# Patient Record
Sex: MALE | Race: AMERICAN INDIAN OR ALASKA NATIVE
[De-identification: names, ages, dates, MRNs, and addresses within clinical notes are randomized per-mention and may not be internally consistent; named-entity substitution may affect disease eponyms.]

---

## 2018-06-23 ENCOUNTER — HOSPITAL ENCOUNTER (EMERGENCY)
Dept: HOSPITAL 43 - DL.ED | Age: 63
Discharge: HOME | End: 2018-06-23
Payer: MEDICAID

## 2018-06-23 DIAGNOSIS — A04.72: Primary | ICD-10-CM

## 2018-06-23 DIAGNOSIS — Z79.84: ICD-10-CM

## 2018-06-23 DIAGNOSIS — E11.9: ICD-10-CM

## 2018-06-23 DIAGNOSIS — E78.00: ICD-10-CM

## 2018-06-23 DIAGNOSIS — I10: ICD-10-CM

## 2018-06-23 LAB
CHLORIDE SERPL-SCNC: 98 MMOL/L (ref 101–111)
SODIUM SERPL-SCNC: 136 MMOL/L (ref 135–145)

## 2018-06-24 NOTE — EDM.PDOC
Scribed by Anh Turner 06/23/18 0017 for Elsie Joe NP





ED HPI GENERAL MEDICAL PROBLEM





- General


Chief Complaint: Gastrointestinal Problem


Stated Complaint: CAME BY AMBULANCE. ABD PAINS


Time Seen by Provider: 06/23/18 17:14


Source of Information: Reports: Patient, RN, RN Notes Reviewed


History Limitations: Reports: No Limitations





- History of Present Illness


INITIAL COMMENTS - FREE TEXT/NARRATIVE: 


Patient presents to ER per [facility] service with complaint of 

diarrhea and stomach pain. Patient states he was admitted here last week and 

discharged on Thursday last week with dehydration, diarrhea, C-diff, and 

hypokalemia. Patient states he has not taken any medications he was discharged 

with. Rates his abdominal pain 5/10. He has fever, chills, nausea, vomiting, 

diarrhea and shortness of breath. No chest pain. 


Onset: Gradual


Duration: Getting Worse


Location: Reports: Generalized


Severity: Moderate


Improves with: Reports: None


Worsens with: Reports: None


Associated Symptoms: Reports: No Other Symptoms


  ** Abdominal


Pain Score (Numeric/FACES): 5





- Related Data


 Allergies











Allergy/AdvReac Type Severity Reaction Status Date / Time


 


No Known Allergies Allergy   Verified 06/16/18 01:07











Home Meds: 


 Home Meds





DULoxetine [Cymbalta] 60 mg PO DAILY 06/16/18 [History]


Glimepiride [Amaryl] 2 mg PO BIDMEALS 06/16/18 [History]


Hydrochlorothiazide 12.5 mg PO DAILY 06/16/18 [History]


Losartan [Cozaar] 100 mg PO DAILY 06/16/18 [History]


Meloxicam 7.5 mg PO BID 06/16/18 [History]


atorvaSTATin [Lipitor] 10 mg PO DAILY 06/16/18 [History]


metFORMIN HCl [Glucophage] 1,000 mg PO BIDMEALS 06/16/18 [History]











Past Medical History


Cardiovascular History: Reports: High Cholesterol, Hypertension


Gastrointestinal History: Reports: Other (See Below)


Other Gastrointestinal History: c-diff


Musculoskeletal History: Reports: Osteoarthritis


Other Musculoskeletal History: COMPRESSION FRACTURE t12, L1, L5


Neurological History: Reports: Other (See Below)


Other Neuro History: BILATERAL HAND PAIN, RESTLESS LEGS


Endocrine/Metabolic History: Reports: Diabetes, Type II





Social & Family History





- Family History


Family Medical History: Noncontributory





- Tobacco Use


Smoking Status *Q: Never Smoker





- Caffeine Use


Caffeine Use: Reports: Coffee, Tea





- Recreational Drug Use


Recreational Drug Use: No





ED ROS GENERAL





- Review of Systems


Review Of Systems: ROS reveals no pertinent complaints other than HPI.





ED EXAM, GI/ABD





- Physical Exam


Exam: See Below


Exam Limited By: No Limitations


General Appearance: Alert, WD/WN, No Apparent Distress


Eyes: Bilateral: Normal Appearance


Ears: Normal External Exam, Normal Canal, Hearing Grossly Normal, Normal TMs


Nose: Normal Inspection, Normal Mucosa, No Blood


Throat/Mouth: Normal Inspection, Normal Lips, Normal Teeth, Normal Gums, Normal 

Oropharynx, Normal Voice, No Airway Compromise


Head: Atraumatic, Normocephalic


Neck: Normal Inspection, Supple, Non-Tender, Full Range of Motion


Respiratory/Chest: No Respiratory Distress, Lungs Clear, Normal Breath Sounds, 

No Accessory Muscle Use, Chest Non-Tender


Cardiovascular: Normal Peripheral Pulses, Regular Rate, Rhythm, No Edema, No 

Gallop, No JVD, No Murmur, No Rub


GI/Abdominal Exam: Tender


 (Male) Exam: Deferred


Rectal (Males) Exam: Deferred


Back Exam: Normal Inspection, Full Range of Motion, NT


Extremities: Other (bilateral foot edema.)


Neurological: Alert, Oriented, CN II-XII Intact, Normal Cognition, Normal Gait, 

Normal Reflexes, No Motor/Sensory Deficits


Psychiatric: Normal Affect, Normal Mood


Skin Exam: Warm, Dry, Intact, Normal Color, No Rash


Lymphatic: No Adenopathy





Course





- Vital Signs


Last Recorded V/S: 


 Last Vital Signs











Temp  98.2 F   06/23/18 16:09


 


Pulse  107 H  06/23/18 16:09


 


Resp  16   06/23/18 16:09


 


BP  113/79   06/23/18 16:09


 


Pulse Ox  100   06/23/18 16:09














- Orders/Labs/Meds


Orders: 


 Active Orders 24 hr











 Category Date Time Status


 


 Peripheral IV Care [RC] .AS DIRECTED Care  06/23/18 17:24 Active


 


 Peripheral IV Insertion Adult [OM.PC] Stat Oth  06/23/18 17:24 Ordered











Labs: 


 Laboratory Tests











  06/23/18 06/23/18 Range/Units





  17:48 17:48 


 


WBC  10.4 H   (5.0-10.0)  10^3/uL


 


RBC  3.92 L   (4.6-6.2)  10^6/uL


 


Hgb  12.2 L   (14.0-18.0)  g/dL


 


Hct  35.5 L   (40.0-54.0)  %


 


MCV  90.6   ()  fL


 


MCH  31.1   (27.0-34.0)  pg


 


MCHC  34.4   (33.0-35.0)  g/dL


 


Plt Count  425  D   (150-450)  10^3/uL


 


Neut % (Auto)  71.0   (42.2-75.2)  %


 


Lymph % (Auto)  18.8 L   (20.5-50.1)  %


 


Mono % (Auto)  9.5 H   (2-8)  %


 


Eos % (Auto)  0.4 L   (1.0-3.0)  %


 


Baso % (Auto)  0.3   (0.0-1.0)  %


 


Sodium   136  (135-145)  mmol/L


 


Potassium   3.6  (3.6-5.0)  mmol/L


 


Chloride   98 L  (101-111)  mmol/L


 


Carbon Dioxide   30.0  (21.0-31.0)  mmol/L


 


Anion Gap   11.6  


 


BUN   6 L  (7-18)  mg/dL


 


Creatinine   0.9  (0.6-1.3)  mg/dL


 


Est Cr Clr Drug Dosing   79.56  mL/min


 


Estimated GFR (MDRD)   > 60  


 


BUN/Creatinine Ratio   6.66  


 


Glucose   156 H  ()  mg/dL


 


Calcium   8.4  (8.4-10.2)  mg/dl


 


Total Bilirubin   0.5  (0.2-1.0)  mg/dL


 


AST   17  (10-42)  IU/L


 


ALT   12  (10-60)  IU/L


 


Alkaline Phosphatase   53  ()  IU/L


 


Total Protein   6.6 L  (6.7-8.2)  g/dl


 


Albumin   2.7 L  (3.2-5.5)  g/dl


 


Globulin   3.9  


 


Albumin/Globulin Ratio   0.69  











Meds: 


Medications














Discontinued Medications














Generic Name Dose Route Start Last Admin





  Trade Name Freq  PRN Reason Stop Dose Admin


 


Sodium Chloride  1,000 mls @ 200 mls/hr  06/23/18 17:24  06/23/18 18:51





  Normal Saline  IV  06/23/18 22:23  Not Given





  .BOLUS ONE   





     





     





     





     


 


Sodium Chloride  10 ml  06/23/18 17:24  





  Saline Flush  FLUSH   





  ASDIRECTED PRN   





  Keep Vein Open   





     





     





     














Departure





- Departure


Time of Disposition: 18:52


Disposition: Home, Self-Care 01


Condition: Fair


Clinical Impression: 


 Diarrhea, C. difficile diarrhea








- Discharge Information


Instructions:  Food Choices to Help Relieve Diarrhea, Adult, Clostridium 

Difficile Infection, Easy-to-Read, Nausea and Vomiting, Adult, Easy-to-Read, 

Dehydration, Adult, Easy-to-Read, Abdominal Pain, Adult, Easy-to-Read, Diarrhea

, Adult, Easy-to-Read


Referrals: 


PCP,None [Primary Care Provider] - 


Forms:  ED Department Discharge


Additional Instructions: 


Follow up with Dr. Chavez next week


Go to [facility] and ask for blister packaging


Drink plenty of fluids


Take all medications as prescribed








- My Orders


Last 24 Hours: 


My Active Orders





06/23/18 17:24


Peripheral IV Care [RC] .AS DIRECTED 


Peripheral IV Insertion Adult [OM.PC] Stat 














- Assessment/Plan


Last 24 Hours: 


My Active Orders





06/23/18 17:24


Peripheral IV Care [RC] .AS DIRECTED 


Peripheral IV Insertion Adult [OM.PC] Stat 














I have read and agree with the documentation that has been completed regarding 

this visit. By signing this record, I attest that the documentation was 

completed in my physical presence and is an accurate record of the encounter.

## 2019-04-20 ENCOUNTER — HOSPITAL ENCOUNTER (EMERGENCY)
Dept: HOSPITAL 43 - DL.ED | Age: 64
Discharge: HOME | End: 2019-04-20
Payer: MEDICAID

## 2019-04-20 DIAGNOSIS — Z79.899: ICD-10-CM

## 2019-04-20 DIAGNOSIS — E11.9: ICD-10-CM

## 2019-04-20 DIAGNOSIS — I10: ICD-10-CM

## 2019-04-20 DIAGNOSIS — Z79.84: ICD-10-CM

## 2019-04-20 DIAGNOSIS — M79.605: Primary | ICD-10-CM

## 2019-04-20 DIAGNOSIS — E78.00: ICD-10-CM

## 2019-04-20 LAB
ANION GAP SERPL CALC-SCNC: 15.9 MMOL/L
CHLORIDE SERPL-SCNC: 100 MMOL/L (ref 101–111)
SODIUM SERPL-SCNC: 137 MMOL/L (ref 135–145)
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
## 2019-04-27 ENCOUNTER — HOSPITAL ENCOUNTER (INPATIENT)
Dept: HOSPITAL 43 - DL.ED | Age: 64
LOS: 2 days | Discharge: HOME | DRG: 603 | End: 2019-04-29
Attending: HOSPITALIST | Admitting: INTERNAL MEDICINE
Payer: MEDICAID

## 2019-04-27 DIAGNOSIS — F32.9: ICD-10-CM

## 2019-04-27 DIAGNOSIS — L03.115: ICD-10-CM
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
## 2019-04-27 DIAGNOSIS — Z79.899: ICD-10-CM

## 2019-04-27 DIAGNOSIS — E87.6: ICD-10-CM

## 2019-04-27 DIAGNOSIS — E11.9: ICD-10-CM

## 2019-04-27 DIAGNOSIS — I10: ICD-10-CM

## 2019-04-27 DIAGNOSIS — L03.116: Primary | ICD-10-CM

## 2019-04-27 DIAGNOSIS — M19.91: ICD-10-CM

## 2019-04-27 DIAGNOSIS — Z79.84: ICD-10-CM

## 2019-04-27 DIAGNOSIS — E78.00: ICD-10-CM

## 2019-04-27 DIAGNOSIS — F10.120: ICD-10-CM

## 2019-04-27 LAB
ANION GAP SERPL CALC-SCNC: 16.3 MMOL/L
CHLORIDE SERPL-SCNC: 102 MMOL/L (ref 101–111)
SODIUM SERPL-SCNC: 142 MMOL/L (ref 135–145)

## 2019-04-27 PROCEDURE — HZ2ZZZZ DETOXIFICATION SERVICES FOR SUBSTANCE ABUSE TREATMENT: ICD-10-PCS | Performed by: INTERNAL MEDICINE

## 2019-04-27 PROCEDURE — G0480 DRUG TEST DEF 1-7 CLASSES: HCPCS

## 2019-04-27 RX ADMIN — HEPARIN SODIUM SCH UNITS: 5000 INJECTION, SOLUTION INTRAVENOUS; SUBCUTANEOUS at 21:16
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
## 2019-04-28 LAB
ANION GAP SERPL CALC-SCNC: 16.1 MMOL/L
CHLORIDE SERPL-SCNC: 97 MMOL/L (ref 101–111)
SODIUM SERPL-SCNC: 139 MMOL/L (ref 135–145)

## 2019-04-28 RX ADMIN — HEPARIN SODIUM SCH: 5000 INJECTION, SOLUTION INTRAVENOUS; SUBCUTANEOUS at 05:41

## 2019-04-28 RX ADMIN — HEPARIN SODIUM SCH UNITS: 5000 INJECTION, SOLUTION INTRAVENOUS; SUBCUTANEOUS at 13:39

## 2019-04-28 RX ADMIN — HEPARIN SODIUM SCH UNITS: 5000 INJECTION, SOLUTION INTRAVENOUS; SUBCUTANEOUS at 22:00
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
## 2019-04-29 RX ADMIN — HEPARIN SODIUM SCH UNITS: 5000 INJECTION, SOLUTION INTRAVENOUS; SUBCUTANEOUS at 06:25

## 2019-04-29 NOTE — PCM.DCSUM1
**Discharge Summary





- Hospital Course


Free Text/Narrative:: 





This is a 65 yo male with past medical history of Diabetes II, Hypertension, 

Hyperlipidemia, chronic alcohol use who presents with b/l lower ext cellulitis 

and alcohol intoxication. 





He was monitored in the hospital, no withdrawal symptoms, CIWA was 0.


Was treated with IV zosyn initially for the cellulitis with remarkable 

improvement in the redness, swelling, pain of LE


Was discharged on oral Keflex to complete 10 day course


Potassium was low during admission. Was encouraged to eat potassium rich foods 

and also prescribed oral potassium 10 mEq daily for one week





Follow up with PCP


Diagnosis: Stroke: No


Modified Caldwell Scale: No Symptoms at All


Modified Caldwell Scale Score: 0





- Discharge Data


Discharge Date: 04/29/19


Discharge Disposition: Home, Self-Care 01


Condition: Stable





- Patient Instructions


Diet: Usual Diet as Tolerated, No Alcoholic Beverages


Activity: As Tolerated


Showering/Bathing: May Shower





- Discharge Plan


*PRESCRIPTION DRUG MONITORING PROGRAM REVIEWED*: No


*COPY OF PRESCRIPTION DRUG MONITORING REPORT IN PATIENT JESÚS: No


Prescriptions/Med Rec: 


cephALEXin [Keflex] 500 mg PO Q8H #21 cap


Potassium Chloride 10 meq PO DAILY #7 capsule.er


Home Medications: 


 Home Meds





DULoxetine [Cymbalta] 60 mg PO DAILY 06/16/18 [History]


Glimepiride [Amaryl] 2 mg PO BIDMEALS 06/16/18 [History]


Losartan [Cozaar] 100 mg PO DAILY 06/16/18 [History]


Meloxicam 7.5 mg PO BID 06/16/18 [History]


atorvaSTATin [Lipitor] 10 mg PO DAILY 06/16/18 [History]


hydroCHLOROthiazide [Hydrochlorothiazide] 12.5 mg PO DAILY 06/16/18 [History]


metFORMIN HCl [Glucophage] 1,000 mg PO BIDMEALS 06/16/18 [History]


Potassium Chloride 10 meq PO DAILY #7 capsule.er 04/29/19 [Rx]


cephALEXin [Keflex] 500 mg PO Q8H #21 cap 04/29/19 [Rx]








Patient Handouts:  Alcohol Intoxication, Cellulitis, Adult, Easy-to-Read


Referrals: 


PCP,None [Primary Care Provider] - 





- Discharge Summary/Plan Comment


DC Time >30 min.: Yes





- General Info


Date of Service: 04/29/19


Admission Dx/Problem (Free Text: 


Admitted with: B/L LE cellulitis and acute alcohol Intoxication





Subjective Update: 


pt was seen in room doing well, no nausea or vomiting, appetite is good and B/L 

Erythema has improved








- Review of Systems


General: Reports: No Symptoms


HEENT: Reports: No Symptoms


Pulmonary: Reports: No Symptoms


Cardiovascular: Reports: No Symptoms


Gastrointestinal: Reports: No Symptoms


Genitourinary: Reports: No Symptoms


Musculoskeletal: Reports: Other (improved redness and swelling of b/l LE)





- Patient Data


Vitals - Most Recent: 


 Last Vital Signs











Temp  36.9 C   04/29/19 07:52


 


Pulse  91   04/29/19 07:52


 


Resp  20   04/29/19 07:52


 


BP  146/84 H  04/29/19 07:59


 


Pulse Ox  98   04/29/19 07:52











Weight - Most Recent: 68.039 kg


I&O - Last 24 hours: 


 Intake & Output











 04/28/19 04/29/19 04/29/19





 22:59 06:59 14:59


 


Intake Total 1092  602


 


Output Total 950  


 


Balance 142  602











Lab Results - Last 24 hrs: 


 Laboratory Results - last 24 hr











  04/28/19 04/28/19 04/28/19 Range/Units





  11:20 17:03 20:51 


 


Potassium     (3.6-5.0)  mmol/L


 


POC Glucose  152 H  174 H  124 H  ()  mg/dl














  04/29/19 04/29/19 Range/Units





  06:05 07:47 


 


Potassium  3.2 L   (3.6-5.0)  mmol/L


 


POC Glucose   102  ()  mg/dl











Med Orders - Current: 


 Current Medications





Acetaminophen (Tylenol)  650 mg PO Q4H PRN


   PRN Reason: Pain (mild 1-3 )/fever


   Last Admin: 04/29/19 07:58 Dose:  650 mg


Atorvastatin Calcium (Lipitor)  10 mg PO DAILY [facility]


   Last Admin: 04/29/19 08:00 Dose:  10 mg


Docusate Sodium (Colace)  100 mg PO DAILY PRN


   PRN Reason: Constipation


Duloxetine HCl (Cymbalta)  60 mg PO DAILY [facility]


   Last Admin: 04/29/19 08:00 Dose:  60 mg


Glimepiride (Amaryl)  2 mg PO BIDMEALS [facility]


   Last Admin: 04/29/19 07:57 Dose:  2 mg


Heparin Sodium (Porcine) (Heparin Sodium)  5,000 units SUBCUT Q8H [facility]


   Last Admin: 04/29/19 06:25 Dose:  5,000 units


Hydrochlorothiazide (Hydrochlorothiazide)  12.5 mg PO DAILY [facility]


   Last Admin: 04/29/19 08:00 Dose:  12.5 mg


Piperacillin Sod/Tazobactam (Sod 3.375 gm/ Sodium Chloride)  100 mls @ 200 mls/

hr IV Q6H [facility]


   Last Infusion: 04/29/19 10:09 Dose:  Infused


Insulin Human Lispro (Humalog)  0 unit SUBCUT ACBED [facility]; Protocol


   Last Admin: 04/29/19 07:59 Dose:  Not Given


Lorazepam (Ativan)  0 mg PO ASDIRECTED PRN; Protocol


   PRN Reason: Anxiety


Lorazepam (Ativan)  0 mg IVPUSH ASDIRECTED PRN; Protocol


   PRN Reason: Anxiety


Losartan Potassium (Cozaar)  100 mg PO DAILY [facility]


   Last Admin: 04/29/19 07:59 Dose:  100 mg


Metformin HCl (Glucophage)  1,000 mg PO BIDMEALS [facility]


   Last Admin: 04/29/19 08:00 Dose:  1,000 mg


Sodium Chloride (Saline Flush)  10 ml FLUSH Q4HR PRN


   PRN Reason: Keep Vein Open


   Last Admin: 04/29/19 08:01 Dose:  10 ml





Discontinued Medications





Potassium Chloride (Klor-Con 10)  40 meq PO ONETIME ONE


   Stop: 04/28/19 13:16


   Last Admin: 04/28/19 13:38 Dose:  40 meq


Potassium Chloride (Klor-Con 10)  40 meq PO ONETIME ONE


   Stop: 04/29/19 09:46


   Last Admin: 04/29/19 10:16 Dose:  40 meq











- Exam


General: Reports: Alert, Oriented


HEENT: Reports: Pupils Equal


Neck: Reports: Supple


Lungs: Reports: Clear to Auscultation


Cardiovascular: Reports: Regular Rate, Regular Rhythm


GI/Abdominal Exam: Normal Bowel Sounds, Soft

## 2019-08-24 ENCOUNTER — HOSPITAL ENCOUNTER (EMERGENCY)
Dept: HOSPITAL 43 - DL.ED | Age: 64
Discharge: NURSING, REHAB OR LONG-TERM CARE | End: 2019-08-24
Payer: MEDICAID

## 2019-08-24 DIAGNOSIS — Z79.84: ICD-10-CM

## 2019-08-24 DIAGNOSIS — S32.039A: ICD-10-CM

## 2019-08-24 DIAGNOSIS — N17.9: ICD-10-CM

## 2019-08-24 DIAGNOSIS — E87.1: ICD-10-CM

## 2019-08-24 DIAGNOSIS — I10: ICD-10-CM

## 2019-08-24 DIAGNOSIS — Z79.899: ICD-10-CM

## 2019-08-24 DIAGNOSIS — S32.059A: ICD-10-CM

## 2019-08-24 DIAGNOSIS — E87.6: ICD-10-CM
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
## 2019-08-24 DIAGNOSIS — E78.00: ICD-10-CM

## 2019-08-24 DIAGNOSIS — S32.029A: Primary | ICD-10-CM

## 2019-08-24 DIAGNOSIS — E11.9: ICD-10-CM

## 2019-08-24 LAB
ANION GAP SERPL CALC-SCNC: 24 MMOL/L
CHLORIDE SERPL-SCNC: 76 MMOL/L (ref 101–111)
SODIUM SERPL-SCNC: 121 MMOL/L (ref 135–145)

## 2019-08-24 PROCEDURE — 36415 COLL VENOUS BLD VENIPUNCTURE: CPT

## 2019-08-24 PROCEDURE — 72100 X-RAY EXAM L-S SPINE 2/3 VWS: CPT

## 2019-08-24 PROCEDURE — 99285 EMERGENCY DEPT VISIT HI MDM: CPT

## 2019-08-24 PROCEDURE — 80053 COMPREHEN METABOLIC PANEL: CPT

## 2019-08-24 PROCEDURE — 96375 TX/PRO/DX INJ NEW DRUG ADDON: CPT

## 2019-08-24 PROCEDURE — 96361 HYDRATE IV INFUSION ADD-ON: CPT

## 2019-08-24 PROCEDURE — 81001 URINALYSIS AUTO W/SCOPE: CPT

## 2019-08-24 PROCEDURE — 80305 DRUG TEST PRSMV DIR OPT OBS: CPT

## 2019-08-24 PROCEDURE — 96365 THER/PROPH/DIAG IV INF INIT: CPT

## 2019-08-24 PROCEDURE — 85025 COMPLETE CBC W/AUTO DIFF WBC: CPT

## 2019-08-24 NOTE — EDM.PDOC
Scribed by Anh Turner 08/24/19 0167 for Manuel Vanegas PA





ED HPI GENERAL MEDICAL PROBLEM





- General


Chief Complaint: Back Pain or Injury


Stated Complaint: AMBULANCE


Time Seen by Provider: 08/24/19 12:00


Source of Information: Reports: Patient, EMS, EMS Notes Reviewed, RN, RN Notes 

Reviewed


History Limitations: Reports: No Limitations





- History of Present Illness


INITIAL COMMENTS - FREE TEXT/NARRATIVE: 


Patient presents to ER by [facility] with complaint of low 

back for 2 days. He fell out of bed 2 days ago and had increased low back pain. 

He took medications previously given to him  by provider, Nikhil Pelayo. He has 

had no clinic visit for this problem. He has a history of chronic back pain 

with compression fracture 2 years ago and increased pain today. He has taken no 

Tylenol or Ibuprofen. 


Onset Date: 08/22/19


Duration: Getting Worse


Location: Reports: Back


Quality: Reports: Ache


Severity: Moderate


Improves with: Reports: None


Worsens with: Reports: None


Associated Symptoms: Reports: No Other Symptoms


  ** Lower Back


Pain Score (Numeric/FACES): 8





- Related Data


 Allergies











Allergy/AdvReac Type Severity Reaction Status Date / Time


 


No Known Allergies Allergy   Verified 08/24/19 12:10











Home Meds: 


 Home Meds





DULoxetine [Cymbalta] 60 mg PO DAILY 06/16/18 [History]


Glimepiride [Amaryl] 2 mg PO BIDMEALS 06/16/18 [History]


Losartan [Cozaar] 100 mg PO DAILY 06/16/18 [History]


Meloxicam 7.5 mg PO BID 06/16/18 [History]


atorvaSTATin [Lipitor] 10 mg PO DAILY 06/16/18 [History]


hydroCHLOROthiazide [Hydrochlorothiazide] 12.5 mg PO DAILY 06/16/18 [History]


metFORMIN HCl [Glucophage] 1,000 mg PO BIDMEALS 06/16/18 [History]


Potassium Chloride 10 meq PO DAILY #7 capsule.er 04/29/19 [Rx]











Past Medical History


HEENT History: Reports: None


Cardiovascular History: Reports: High Cholesterol, Hypertension


Respiratory History: Reports: None


Gastrointestinal History: Reports: Other (See Below)


Other Gastrointestinal History: c-diff


Genitourinary History: Reports: None


Musculoskeletal History: Reports: Osteoarthritis


Other Musculoskeletal History: COMPRESSION FRACTURE t12, L1, L5, nerve pain


Neurological History: Reports: Other (See Below)


Other Neuro History: BILATERAL HAND PAIN, RESTLESS LEGS


Psychiatric History: Reports: Addiction, Depression


Endocrine/Metabolic History: Reports: Diabetes, Type II


Hematologic History: Reports: None


Immunologic History: Reports: None


Oncologic (Cancer) History: Reports: None


Dermatologic History: Reports: None





- Infectious Disease History


Infectious Disease History: Reports: C-Difficile





- Past Surgical History


Head Surgeries/Procedures: Reports: None





Social & Family History





- Family History


Family Medical History: Noncontributory





- Caffeine Use


Caffeine Use: Reports: Coffee





ED ROS GENERAL





- Review of Systems


Review Of Systems: ROS reveals no pertinent complaints other than HPI.





ED EXAM,LOWER BACK PAIN/INJURY





- Physical Exam


Exam: See Below


Exam Limited By: No Limitations


General Appearance: Alert, WD/WN, No Apparent Distress


Eye Exam: Bilateral Eye: EOMI, Normal Inspection, PERRL


Ears: Normal External Exam, Normal Canal, Hearing Grossly Normal, Normal TMs


Nose: Normal Inspection, Normal Mucosa, No Blood


Throat/Mouth: Normal Inspection, Normal Lips, Normal Teeth, Normal Gums, Normal 

Oropharynx, Normal Voice, No Airway Compromise


Head: Atraumatic, Normocephalic


Neck: Normal Inspection, Supple, Non-Tender, Full Range of Motion


Respiratory/Chest: No Respiratory Distress, Lungs Clear, Normal Breath Sounds, 

No Accessory Muscle Use, Chest Non-Tender


Cardiovascular: Normal Peripheral Pulses, Regular Rate, Rhythm, No Edema, No 

Gallop, No JVD, No Murmur, No Rub


GI/Abdominal: Normal Bowel Sounds, Soft, Non-Tender, No Organomegaly, No 

Distention, No Abnormal Bruit, No Mass


 (Male) Exam: Deferred


Rectal (Males) Exam: Deferred


Back Exam: Other (low back pain )


Extremities: Normal Inspection, Normal Range of Motion, Non-Tender, No Pedal 

Edema, Normal Capillary Refill


Neurological: Alert, Normal Mood/Affect, Normal Dorsiflexion, CN II-XII Intact, 

Normal Plantar Flexion, Normal Gait, Normal Reflexes, No Motor/Sensory Deficits

, Oriented x 3


Psychiatric: Normal Affect, Normal Mood


Skin Exam: Warm, Dry, Intact, Normal Color, No Rash


Lymphatic: No Adenopathy





Course





- Vital Signs


Last Recorded V/S: 


 Last Vital Signs











Temp  36.6 C   08/24/19 12:11


 


Pulse  98   08/24/19 16:16


 


Resp  20   08/24/19 16:16


 


BP  131/68   08/24/19 16:16


 


Pulse Ox  98   08/24/19 16:16














- Orders/Labs/Meds


Orders: 





 Active Orders 24 hr











 Category Date Time Status


 


 Potassium Chloride [KCl 10 MEQ in Water 100 ML] 10 meq Med  08/24/19 17:24 

Ordered





 Premix Bag 1 bag   





 IV ONETIME   








 Medication Orders





Potassium Chloride 10 meq/ (Premix)  100 mls @ 100 mls/hr IV ONETIME ONE


   Stop: 08/24/19 18:23


   Last Admin: 08/24/19 17:33  Dose: 100 mls/hr








Labs: 





 Laboratory Tests











  08/24/19 08/24/19 08/24/19 Range/Units





  13:20 13:20 15:58 


 


WBC  10.5 H    (5.0-10.0)  10^3/uL


 


RBC  3.60 L    (4.6-6.2)  10^6/uL


 


Hgb  11.7 L    (14.0-18.0)  g/dL


 


Hct  32.3 L    (40.0-54.0)  %


 


MCV  89.7  D    ()  fL


 


MCH  32.5    (27.0-34.0)  pg


 


MCHC  36.2 H    (33.0-35.0)  g/dL


 


Plt Count  263    (150-450)  10^3/uL


 


Neut % (Auto)  79.9 H    (42.2-75.2)  %


 


Lymph % (Auto)  8.7 L    (20.5-50.1)  %


 


Mono % (Auto)  11.2 H    (2-8)  %


 


Eos % (Auto)  0.0 L    (1.0-3.0)  %


 


Baso % (Auto)  0.2    (0.0-1.0)  %


 


Sodium   121 L D   (135-145)  mmol/L


 


Potassium   3.0 L   (3.6-5.0)  mmol/L


 


Chloride   76 L D   (101-111)  mmol/L


 


Carbon Dioxide   24.0   (21.0-31.0)  mmol/L


 


Anion Gap   24.0   


 


BUN   17   (7-18)  mg/dL


 


Creatinine   1.9 H D   (0.6-1.3)  mg/dL


 


Est Cr Clr Drug Dosing   37.21   mL/min


 


Estimated GFR (MDRD)   36   


 


BUN/Creatinine Ratio   8.94   


 


Glucose   176 H   ()  mg/dL


 


Calcium   9.5   (8.4-10.2)  mg/dl


 


Total Bilirubin   2.6 H   (0.2-1.0)  mg/dL


 


AST   55 H   (10-42)  IU/L


 


ALT   32   (10-60)  IU/L


 


Alkaline Phosphatase   91   ()  IU/L


 


Total Protein   8.1   (6.7-8.2)  g/dl


 


Albumin   4.2   (3.2-5.5)  g/dl


 


Globulin   3.9   


 


Albumin/Globulin Ratio   1.08   


 


Urine Color    Yellow  (YELLOW)  


 


Urine Appearance    Clear  (CLEAR)  


 


Urine pH    6.5  (5.0-9.0)  


 


Ur Specific Gravity    1.010  (1.005-1.030)  


 


Urine Protein    30 H  (NEGATIVE)  


 


Urine Glucose (UA)    250 H  (NEGATIVE)  


 


Urine Ketones    Negative  (NEGATIVE)  


 


Urine Occult Blood    Negative  (NEGATIVE)  


 


Urine Nitrite    Negative  (NEGATIVE)  


 


Urine Bilirubin    Negative  (NEGATIVE)  


 


Urine Urobilinogen    1.0  (0.2-1.0)  mg/dL


 


Ur Leukocyte Esterase    Negative  (NEGATIVE)  


 


Urine RBC    0-5  /HPF


 


Urine WBC    0-5  (0-5/HPF)  /HPF


 


Ur Epithelial Cells    Rare  (NOT SEEN)  /HPF


 


Urine Bacteria    Few  (0-FEW/HPF)  /HPF


 


Urine Mucus    Few H  (NOT SEEN)  /LPF


 


Urine Opiates Screen     (NEGATIVE)  


 


Ur Oxycodone Screen     (NEGATIVE)  


 


Urine Methadone Screen     (NEGATIVE)  


 


Ur Barbiturates Screen     (NEGATIVE)  


 


U Tricyclic Antidepress     (NEGATIVE)  


 


Ur Phencyclidine Scrn     (NEGATIVE)  


 


Ur Amphetamine Screen     (NEGATIVE)  


 


U Methamphetamines Scrn     (NEGATIVE)  


 


Urine MDMA Screen     (NEGATIVE)  


 


U Benzodiazepines Scrn     (NEGATIVE)  


 


Urine Cocaine Screen     (NEGATIVE)  


 


U Marijuana (THC) Screen     (NEGATIVE)  














  08/24/19 Range/Units





  15:58 


 


WBC   (5.0-10.0)  10^3/uL


 


RBC   (4.6-6.2)  10^6/uL


 


Hgb   (14.0-18.0)  g/dL


 


Hct   (40.0-54.0)  %


 


MCV   ()  fL


 


MCH   (27.0-34.0)  pg


 


MCHC   (33.0-35.0)  g/dL


 


Plt Count   (150-450)  10^3/uL


 


Neut % (Auto)   (42.2-75.2)  %


 


Lymph % (Auto)   (20.5-50.1)  %


 


Mono % (Auto)   (2-8)  %


 


Eos % (Auto)   (1.0-3.0)  %


 


Baso % (Auto)   (0.0-1.0)  %


 


Sodium   (135-145)  mmol/L


 


Potassium   (3.6-5.0)  mmol/L


 


Chloride   (101-111)  mmol/L


 


Carbon Dioxide   (21.0-31.0)  mmol/L


 


Anion Gap   


 


BUN   (7-18)  mg/dL


 


Creatinine   (0.6-1.3)  mg/dL


 


Est Cr Clr Drug Dosing   mL/min


 


Estimated GFR (MDRD)   


 


BUN/Creatinine Ratio   


 


Glucose   ()  mg/dL


 


Calcium   (8.4-10.2)  mg/dl


 


Total Bilirubin   (0.2-1.0)  mg/dL


 


AST   (10-42)  IU/L


 


ALT   (10-60)  IU/L


 


Alkaline Phosphatase   ()  IU/L


 


Total Protein   (6.7-8.2)  g/dl


 


Albumin   (3.2-5.5)  g/dl


 


Globulin   


 


Albumin/Globulin Ratio   


 


Urine Color   (YELLOW)  


 


Urine Appearance   (CLEAR)  


 


Urine pH   (5.0-9.0)  


 


Ur Specific Gravity   (1.005-1.030)  


 


Urine Protein   (NEGATIVE)  


 


Urine Glucose (UA)   (NEGATIVE)  


 


Urine Ketones   (NEGATIVE)  


 


Urine Occult Blood   (NEGATIVE)  


 


Urine Nitrite   (NEGATIVE)  


 


Urine Bilirubin   (NEGATIVE)  


 


Urine Urobilinogen   (0.2-1.0)  mg/dL


 


Ur Leukocyte Esterase   (NEGATIVE)  


 


Urine RBC   /HPF


 


Urine WBC   (0-5/HPF)  /HPF


 


Ur Epithelial Cells   (NOT SEEN)  /HPF


 


Urine Bacteria   (0-FEW/HPF)  /HPF


 


Urine Mucus   (NOT SEEN)  /LPF


 


Urine Opiates Screen  Negative  (NEGATIVE)  


 


Ur Oxycodone Screen  Negative  (NEGATIVE)  


 


Urine Methadone Screen  Negative  (NEGATIVE)  


 


Ur Barbiturates Screen  Negative  (NEGATIVE)  


 


U Tricyclic Antidepress  Positive H  (NEGATIVE)  


 


Ur Phencyclidine Scrn  Negative  (NEGATIVE)  


 


Ur Amphetamine Screen  Negative  (NEGATIVE)  


 


U Methamphetamines Scrn  Negative  (NEGATIVE)  


 


Urine MDMA Screen  Negative  (NEGATIVE)  


 


U Benzodiazepines Scrn  Negative  (NEGATIVE)  


 


Urine Cocaine Screen  Negative  (NEGATIVE)  


 


U Marijuana (THC) Screen  Positive H  (NEGATIVE)  











Meds: 





Medications











Generic Name Dose Route Start Last Admin





  Trade Name Freq  PRN Reason Stop Dose Admin


 


Potassium Chloride 10 meq/  100 mls @ 100 mls/hr  08/24/19 17:24  08/24/19 17:33





  Premix  IV  08/24/19 18:23  100 mls/hr





  ONETIME ONE   Administration





     





     





     





     














Discontinued Medications














Generic Name Dose Route Start Last Admin





  Trade Name Vj  PRN Reason Stop Dose Admin


 


Hydromorphone HCl  1 mg  08/24/19 17:37  





  Dilaudid  IVPUSH  08/24/19 17:38  





  ONETIME ONE   





     





     





     





     


 


Sodium Chloride  1,000 mls @ 999 mls/hr  08/24/19 14:50  08/24/19 16:17





  Normal Saline  IV  08/24/19 15:50  999 mls/hr





  .BOLUS ONE   Administration





     





     





     





     














Departure





- Departure


Time of Disposition: 17:39


Disposition: DC/Tfer to Acute Hospital [facility]


Condition: Fair


Clinical Impression: 


 Hypokalemia, Hyponatremia





Compression fx, lumbar spine


Qualifiers:


 Encounter type: initial encounter Lumbar vertebra fracture level: unspecified 

lumbar vertebra Qualified Code(s): S32.000A - Wedge compression fracture of 

unspecified lumbar vertebra, initial encounter for closed fracture





Acute renal failure


Qualifiers:


 Acute renal failure type: unspecified Qualified Code(s): N17.9 - Acute kidney 

failure, unspecified








- Discharge Information


*PRESCRIPTION DRUG MONITORING PROGRAM REVIEWED*: Not Applicable


*COPY OF PRESCRIPTION DRUG MONITORING REPORT IN PATIENT JESÚS: Not Applicable


Forms:  Interfacility Transfer EMTALA


Care Plan Goals: 


Discussed the patient's history, examination, x-ray and lab results with Dr. Guevara. Dr. Guevara accepted the patient for continued evaluation and 

management. The patient will be transported by [facility]. 





- My Orders


Last 24 Hours: 





My Active Orders





08/24/19 17:24


Potassium Chloride [KCl 10 MEQ in Water 100 ML] 10 meq   Premix Bag 1 bag IV 

ONETIME 














- Assessment/Plan


Last 24 Hours: 





My Active Orders





08/24/19 17:24


Potassium Chloride [KCl 10 MEQ in Water 100 ML] 10 meq   Premix Bag 1 bag IV 

ONETIME 














I have read and agree with the documentation that has been completed regarding 

this visit. By signing this record, I attest that the documentation was 

completed in my physical presence and is an accurate record of the encounter.

## 2019-08-29 ENCOUNTER — HOSPITAL ENCOUNTER (INPATIENT)
Dept: HOSPITAL 43 - DL.MS | Age: 64
LOS: 14 days | Discharge: HOME | DRG: 552 | End: 2019-09-12
Attending: HOSPITALIST | Admitting: HOSPITALIST
Payer: MEDICAID

## 2019-08-29 DIAGNOSIS — Z79.84: ICD-10-CM

## 2019-08-29 DIAGNOSIS — Z99.3: ICD-10-CM

## 2019-08-29 DIAGNOSIS — E78.00: ICD-10-CM

## 2019-08-29 DIAGNOSIS — Z79.82: ICD-10-CM

## 2019-08-29 DIAGNOSIS — B02.9: ICD-10-CM

## 2019-08-29 DIAGNOSIS — S32.000A: ICD-10-CM

## 2019-08-29 DIAGNOSIS — M54.5: Primary | ICD-10-CM

## 2019-08-29 DIAGNOSIS — G89.29: ICD-10-CM

## 2019-08-29 DIAGNOSIS — E87.6: ICD-10-CM

## 2019-08-29 DIAGNOSIS — M19.90: ICD-10-CM

## 2019-08-29 DIAGNOSIS — E11.9: ICD-10-CM

## 2019-08-29 DIAGNOSIS — Z79.899: ICD-10-CM
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
## 2019-08-29 DIAGNOSIS — G25.81: ICD-10-CM

## 2019-08-29 DIAGNOSIS — F17.200: ICD-10-CM

## 2019-08-29 DIAGNOSIS — Z72.89: ICD-10-CM

## 2019-08-29 DIAGNOSIS — E87.1: ICD-10-CM

## 2019-08-29 DIAGNOSIS — Z71.6: ICD-10-CM

## 2019-08-29 DIAGNOSIS — I10: ICD-10-CM

## 2019-08-29 RX ADMIN — OXYCODONE HYDROCHLORIDE AND ACETAMINOPHEN PRN TAB: 5; 325 TABLET ORAL at 15:05

## 2019-08-29 NOTE — PCM.HP
H&P History of Present Illness





- General


Date of Service: 08/29/19


Admit Problem/Dx: 


 Admission Diagnosis/Problem





Admission Diagnosis/Problem      Back pain








Source of Information: Patient


History Limitations: Reports: No Limitations





- History of Present Illness


Initial Comments - Free Text/Narative: 





The patient is a 64-year-old man with medical history of diabetes mellitus type 

2, chronic low back pain, previous compression fracture,. The patient does a 

history of alcohol and tobacco use disorder. The patient fell 2 days prior to 

recent hospitalization at the [facility]. Thereafter began to experience 

severe low back pain. Because of that was admitted to the hospital. He had MRI 

of the lumbosacral spine which showed acute on chronic compression fracture at 

L2 with significant edema. The patient was managed conservatively. Continues to 

have pain hence was transferred to swing bed for physical and occupational 

therapy. At this time he rates intensity of his pain is 5 on a scale 0-10. It 

is worse with activity and better with rest and.


  ** Right Groin


Pain Score (Numeric/FACES): 5





- Related Data


Allergies/Adverse Reactions: 


 Allergies











Allergy/AdvReac Type Severity Reaction Status Date / Time


 


No Known Allergies Allergy   Verified 08/29/19 10:28











Home Medications: 


 Home Meds





DULoxetine [Cymbalta] 60 mg PO DAILY 06/16/18 [History]


Glimepiride [Amaryl] 2 mg PO BIDMEALS 06/16/18 [History]


Losartan [Cozaar] 100 mg PO DAILY 06/16/18 [History]


atorvaSTATin [Lipitor] 10 mg PO DAILY 06/16/18 [History]


metFORMIN HCl [Glucophage] 1,000 mg PO BIDMEALS 06/16/18 [History]


Aspirin [Halfprin] 81 mg PO DAILY 08/29/19 [History]


Gabapentin [Neurontin] 300 mg PO BID 08/29/19 [History]


Magnesium Oxide [Magnesium] 400 mg PO BID 08/29/19 [History]


Sodium Chloride 1 gm PO TID 08/29/19 [History]











Past Medical History


HEENT History: Reports: None


Cardiovascular History: Reports: High Cholesterol, Hypertension


Respiratory History: Reports: None


Gastrointestinal History: Reports: Other (See Below)


Other Gastrointestinal History: c-diff


Genitourinary History: Reports: None


Musculoskeletal History: Reports: Osteoarthritis


Other Musculoskeletal History: COMPRESSION FRACTURE t12, L1, L5, nerve pain


Neurological History: Reports: Other (See Below)


Other Neuro History: BILATERAL HAND PAIN, RESTLESS LEGS


Psychiatric History: Reports: Addiction, Depression


Endocrine/Metabolic History: Reports: Diabetes, Type II


Hematologic History: Reports: None


Immunologic History: Reports: None


Oncologic (Cancer) History: Reports: None


Dermatologic History: Reports: None





- Infectious Disease History


Infectious Disease History: Reports: C-Difficile





- Past Surgical History


Head Surgeries/Procedures: Reports: None





Social & Family History





- Family History


Family Medical History: Noncontributory





- Tobacco Use


Smoking Status *Q: Former Smoker


Years of Tobacco use: 20


Packs/Tins Daily: 2


Used Tobacco, but Quit: Yes


Month/Year Tobacco Last Used: january





- Caffeine Use


Caffeine Use: Reports: Coffee, Soda, Tea





- Alcohol Use


Days Per Week of Alcohol Use: 2


Number of Drinks Per Day: 18


Total Drinks Per Week: 36





- Recreational Drug Use


Recreational Drug Use: No





H&P Review of Systems





- Review of Systems:


Review Of Systems: See Below


General: Reports: No Symptoms


HEENT: Reports: No Symptoms


Pulmonary: Reports: No Symptoms


Cardiovascular: Reports: No Symptoms


Gastrointestinal: Reports: No Symptoms





Exam





- Exam


Exam: See Below





- Vital Signs


Vital Signs: 


 Last Vital Signs











Temp  36.4 C   08/29/19 11:08


 


Pulse  94   08/29/19 11:08


 


Resp  18   08/29/19 11:08


 


BP  161/80 H  08/29/19 11:08


 


Pulse Ox  98   08/29/19 11:08











Weight: 71.577 kg





- Exam


General: Alert, Oriented, Cooperative


Neck: Supple


Lungs: Clear to Auscultation


Cardiovascular: Regular Rate, Tachycardia


GI/Abdominal Exam: Normal Bowel Sounds, Soft, Non-Tender, No Organomegaly, No 

Distention, No Abnormal Bruit, No Mass, Pelvis Stable


 (Male) Exam: No Hernia, Normal Inspection, Normal Prostate, Circumcised


Back Exam: Decreased Range of Motion


Extremities: Normal Inspection, Normal Range of Motion, Non-Tender, No Pedal 

Edema, Normal Capillary Refill


Problem List Initiated/Reviewed/Updated: Yes


Orders Last 24hrs: 


 Active Orders 24 hr











 Category Date Time Status


 


 Patient Status [ADT] Routine ADT  08/29/19 11:08 Ordered


 


 Oxygen Therapy [RC] PRN Care  08/29/19 11:08 Ordered


 


 Up ad Kelly [RC] ASDIRECTED Care  08/29/19 11:07 Ordered


 


 Vital Signs [RC] Q4H Care  08/29/19 11:08 Ordered


 


 OT Evaluation and Treatment [CONS] Routine Cons  08/29/19 11:07 Ordered


 


 PT Evaluation and Treatment [CONS] Routine Cons  08/29/19 11:07 Ordered


 


 Consistent Carbohydrate Diet [DIET] Diet  08/29/19 Lunch Active


 


 Fluid Restriction [DIET] Diet  08/29/19 Lunch Ordered


 


 BASIC METABOLIC PANEL,BMP [CHEM] Routine Lab  08/30/19 11:18 Ordered


 


 Acetaminophen [Tylenol] Med  08/29/19 11:07 Ordered





 650 mg PO Q4H PRN   


 


 Acetaminophen/oxyCODONE [Percocet 325-5 MG] Med  08/29/19 11:07 Ordered





 1 tab PO Q4H PRN   


 


 Aspirin [Halfprin] Med  08/30/19 09:00 Ordered





 81 mg PO DAILY   


 


 DULoxetine [Cymbalta] Med  08/29/19 11:30 Ordered





 60 mg PO DAILY   


 


 Docusate Sodium [Colace] Med  08/29/19 11:07 Ordered





 100 mg PO BID PRN   


 


 Enoxaparin [Lovenox] Med  08/30/19 09:00 Ordered





 40 mg SUBCUT DAILY   


 


 Gabapentin [Neurontin] Med  08/29/19 11:30 Ordered





 300 mg PO BID   


 


 Glimepiride [Amaryl] Med  08/29/19 18:00 Ordered





 2 mg PO BIDMEALS   


 


 Losartan [Cozaar] Med  08/30/19 09:00 Ordered





 100 mg PO DAILY   


 


 Magnesium Oxide [Magnesium] Med  08/29/19 11:30 Ordered





 400 mg PO BID   


 


 Ondansetron [Zofran ODT] Med  08/29/19 11:07 Ordered





 4 mg PO Q6H PRN   


 


 Sodium Chloride Med  08/29/19 14:00 Ordered





 1 gm PO TID   


 


 Zolpidem [Ambien] Med  08/29/19 11:07 Ordered





 5 mg PO BEDTIME PRN   


 


 atorvaSTATin [Lipitor] Med  08/30/19 09:00 Ordered





 10 mg PO DAILY   


 


 metFORMIN HCl [Glucophage] Med  08/29/19 18:00 Ordered





 1,000 mg PO BIDMEALS   


 


 Glucose Management Sub Q Reflex [OM.PC] Click To Edit Oth  08/29/19 11:07 

Ordered


 


 Resuscitation Status Routine Resus Stat  08/29/19 11:07 Ordered








 Medication Orders





Acetaminophen (Tylenol)  650 mg PO Q4H PRN


   PRN Reason: Pain (Mild 1-3)/fever


Aspirin (Halfprin)  81 mg PO DAILY [facility]


Atorvastatin Calcium (Lipitor)  10 mg PO DAILY [facility]


Docusate Sodium (Colace)  100 mg PO BID PRN


   PRN Reason: Constipation


Enoxaparin Sodium (Lovenox)  40 mg SUBCUT DAILY [facility]


Gabapentin (Neurontin)  300 mg PO BID [facility]


Glimepiride (Amaryl)  2 mg PO BIDMEALS [facility]


Non-Formulary Medication (Duloxetine [Cymbalta])  60 mg PO DAILY [facility]


Non-Formulary Medication (Losartan [Cozaar])  100 mg PO DAILY [facility]


Non-Formulary Medication (Magnesium Oxide [Magnesium])  400 mg PO BID [facility]


Non-Formulary Medication (Metformin Hcl [Glucophage])  1,000 mg PO BIDMEALS [facility]


Ondansetron HCl (Zofran Odt)  4 mg PO Q6H PRN


   PRN Reason: nausea, able to take PO


Oxycodone/Acetaminophen (Percocet 325-5 Mg)  1 tab PO Q4H PRN


   PRN Reason: Pain (moderate 4-6)


Sodium Chloride (Sodium Chloride)  1 gm PO TID [facility]


Zolpidem Tartrate (Ambien)  5 mg PO BEDTIME PRN


   PRN Reason: Sleep








Assessment/Plan Comment:: 





#. Fall with compression fracture


The patient fell and sustained compression fracture of L2 


 confirmed with MRI





#. Diabetes mellitus type 2


Patient has been on glimepiride and metformin


Restart both


Monitor blood sugar before meals and at bedtime





#. Hypertension


Resume antihypertensive





#. Hyponatremia


Serum sodium is down to 130


Start patient on fluid restriction 1200/day


Salt tablet





#. Hypokalemia


Serum potassium was 3.1 .


I will obtain repeat basic metabolic panel in the morning





#. Tobacco, alcohol use disorder


Counseling provided





Consult physical therapy


Consult occupational therapy


Start patient on Percocet


Tylenol for pain control


Encourage increased ambulation

## 2019-08-30 LAB
ANION GAP SERPL CALC-SCNC: 12.8 MMOL/L
CHLORIDE SERPL-SCNC: 92 MMOL/L (ref 101–111)
SODIUM SERPL-SCNC: 129 MMOL/L (ref 135–145)

## 2019-08-30 RX ADMIN — OXYCODONE HYDROCHLORIDE AND ACETAMINOPHEN PRN TAB: 5; 325 TABLET ORAL at 10:54

## 2019-08-30 RX ADMIN — OXYCODONE HYDROCHLORIDE AND ACETAMINOPHEN PRN TAB: 5; 325 TABLET ORAL at 20:11

## 2019-08-30 RX ADMIN — OXYCODONE HYDROCHLORIDE AND ACETAMINOPHEN PRN TAB: 5; 325 TABLET ORAL at 01:46

## 2019-08-31 RX ADMIN — OXYCODONE HYDROCHLORIDE AND ACETAMINOPHEN PRN TAB: 5; 325 TABLET ORAL at 21:42

## 2019-09-01 RX ADMIN — OXYCODONE HYDROCHLORIDE AND ACETAMINOPHEN PRN TAB: 5; 325 TABLET ORAL at 22:29

## 2019-09-01 RX ADMIN — AMOXICILLIN AND CLAVULANATE POTASSIUM SCH TAB: 875; 125 TABLET, FILM COATED ORAL at 20:25

## 2019-09-01 NOTE — PCM.PN
- General Info


Date of Service: 09/01/19


Subjective Update: 





The patient developed redness over the supraorbital area extending to the nasal 

bridge. Symptoms started yesterday. They're not itchy. Has some skin 

auscultation in that area. No definite blisters noted. Has not had fever or 

chills.





- Review of Systems


General: Reports: No Symptoms


HEENT: Reports: No Symptoms


Pulmonary: Reports: No Symptoms


Cardiovascular: Reports: No Symptoms


Gastrointestinal: Reports: No Symptoms





- Patient Data


Vitals - Most Recent: 


 Last Vital Signs











Temp  36.8 C   08/31/19 20:00


 


Pulse  96   08/31/19 20:00


 


Resp  20   08/31/19 20:00


 


BP  165/82 H  09/01/19 08:03


 


Pulse Ox  98   08/31/19 20:00











Weight - Most Recent: 71.577 kg


I&O - Last 24 Hours: 


 Intake & Output











 08/31/19 09/01/19 09/01/19





 22:59 06:59 14:59


 


Intake Total 430  


 


Output Total  150 


 


Balance 430 -150 











Lab Results Last 24 Hours: 


 Laboratory Results - last 24 hr











  08/31/19 08/31/19 09/01/19 Range/Units





  11:44 16:40 07:56 


 


POC Glucose  214 H  159 H  124 H  ()  mg/dl











Med Orders - Current: 


 Current Medications





Acetaminophen (Tylenol)  650 mg PO Q4H PRN


   PRN Reason: Pain (Mild 1-3)/fever


   Last Admin: 09/01/19 08:03 Dose:  650 mg


Acyclovir (Zovirax)  800 mg PO TID [facility]


Aspirin (Halfprin)  81 mg PO DAILY [facility]


   Last Admin: 09/01/19 08:03 Dose:  81 mg


Atorvastatin Calcium (Lipitor)  10 mg PO DAILY [facility]


   Last Admin: 09/01/19 08:04 Dose:  10 mg


Docusate Sodium (Colace)  100 mg PO BID PRN


   PRN Reason: Constipation


Duloxetine HCl (Cymbalta)  60 mg PO DAILY [facility]


   Last Admin: 09/01/19 08:04 Dose:  60 mg


Enoxaparin Sodium (Lovenox)  40 mg SUBCUT DAILY [facility]


   Last Admin: 09/01/19 08:04 Dose:  40 mg


Gabapentin (Neurontin)  300 mg PO BID [facility]


   Last Admin: 09/01/19 08:03 Dose:  300 mg


Glimepiride (Amaryl)  2 mg PO BIDMEALS [facility]


   Last Admin: 09/01/19 08:04 Dose:  2 mg


Insulin Human Lispro (Humalog)  0 unit SUBCUT TIDMEALS [facility]; Protocol


   Last Admin: 08/31/19 17:10 Dose:  1 unit


Lidocaine (Lidoderm 5%)  700 mg TOP DAILY [facility]


   Last Admin: 09/01/19 08:07 Dose:  700 mg


Losartan Potassium (Cozaar)  100 mg PO DAILY [facility]


   Last Admin: 09/01/19 08:03 Dose:  100 mg


Magnesium Oxide (Magnesium Oxide)  500 mg PO BID@0800,1800 [facility]


   Last Admin: 09/01/19 08:04 Dose:  500 mg


Metformin HCl (Glucophage)  1,000 mg PO BIDMEALS [facility]


   Last Admin: 09/01/19 08:02 Dose:  1,000 mg


Miscellaneous Information (Remove Patch)  1 ea TRDERM BEDTIME [facility]


   Last Admin: 08/31/19 20:21 Dose:  Not Given


Ondansetron HCl (Zofran Odt)  4 mg PO Q6H PRN


   PRN Reason: nausea, able to take PO


Oxycodone/Acetaminophen (Percocet 325-5 Mg)  1 tab PO Q4H PRN


   PRN Reason: Pain (moderate 4-6)


   Last Admin: 08/31/19 21:42 Dose:  1 tab


Sodium Chloride (Sodium Chloride)  1 gm PO TIDMEALS [facility]


   Last Admin: 09/01/19 08:03 Dose:  1 gm


Zolpidem Tartrate (Ambien)  5 mg PO BEDTIME PRN


   PRN Reason: Sleep





Discontinued Medications





Magnesium Oxide (Magnesium Oxide)  500 mg PO BID@0700,1800 [facility]


   Last Admin: 08/29/19 17:47 Dose:  500 mg











- Exam


General: Alert, Oriented, Cooperative


HEENT: Other (Erythema around the periorbital area on the left extending into 

the scalp)


Lungs: Clear to Auscultation, Normal Respiratory Effort


Cardiovascular: Regular Rate, Regular Rhythm


GI/Abdominal Exam: Normal Bowel Sounds, Soft, Non-Tender, No Organomegaly, No 

Distention, No Abnormal Bruit, No Mass, Pelvis Stable





- Problem List Review


Problem List Initiated/Reviewed/Updated: Yes





- My Orders


Last 24 Hours: 


My Active Orders





09/01/19 14:00


Acyclovir [Zovirax]   800 mg PO TID 





09/01/19 21:00


Amoxicillin/Clavulanate K [Augmentin 875 MG/125 MG]   1 tab PO Q12HR 














- Plan


Plan:: 








#. Possible facial cellulitis


Patient has erythema at the left periorbital area extending into the scalp


Does appear to be shingles. However cannot really tell completely


Start patient on acyclovir 800 mg 3 times a day


Also start patient on Augmentin


 Place patient on contact isolation





#. Fall with compression fracture


The patient fell and sustained compression fracture of L2 


 confirmed with MRI


Physical and occupational therapy





#. Diabetes mellitus type 2


Patient has been on glimepiride and metformin


Monitor blood sugars 





#. Hypertension


Resume antihypertensive





#. Hyponatremia


Serum sodium was down to 130


Start patient on fluid restriction 1200/day


Salt tablet





#. Hypokalemia


Serum potassium was 3.1 .basic metabolic panel





#. Tobacco, alcohol use disorder


Counseling provided

## 2019-09-02 LAB
ANION GAP SERPL CALC-SCNC: 12 MMOL/L
CHLORIDE SERPL-SCNC: 90 MMOL/L (ref 101–111)
SODIUM SERPL-SCNC: 127 MMOL/L (ref 135–145)

## 2019-09-02 RX ADMIN — AMOXICILLIN AND CLAVULANATE POTASSIUM SCH TAB: 875; 125 TABLET, FILM COATED ORAL at 08:13

## 2019-09-02 RX ADMIN — OXYCODONE HYDROCHLORIDE AND ACETAMINOPHEN PRN TAB: 5; 325 TABLET ORAL at 22:09

## 2019-09-02 RX ADMIN — AMOXICILLIN AND CLAVULANATE POTASSIUM SCH TAB: 875; 125 TABLET, FILM COATED ORAL at 21:28

## 2019-09-03 LAB
ANION GAP SERPL CALC-SCNC: 14.2 MMOL/L
CHLORIDE SERPL-SCNC: 88 MMOL/L (ref 101–111)
SODIUM SERPL-SCNC: 127 MMOL/L (ref 135–145)

## 2019-09-03 RX ADMIN — AMOXICILLIN AND CLAVULANATE POTASSIUM SCH TAB: 875; 125 TABLET, FILM COATED ORAL at 21:22

## 2019-09-03 RX ADMIN — OXYCODONE HYDROCHLORIDE AND ACETAMINOPHEN PRN TAB: 5; 325 TABLET ORAL at 11:23

## 2019-09-03 RX ADMIN — AMOXICILLIN AND CLAVULANATE POTASSIUM SCH TAB: 875; 125 TABLET, FILM COATED ORAL at 08:48

## 2019-09-03 RX ADMIN — OXYCODONE HYDROCHLORIDE AND ACETAMINOPHEN PRN TAB: 5; 325 TABLET ORAL at 21:22

## 2019-09-03 NOTE — CR
EXAMINATION: Pelvis 1V or 2V  SEX: Male   AGE: 64 years

 

CLINICAL HISTORY: A 64-year-old male with pelvic pain reported on recent lumbar

spine exam to have "indeterminate" compression fractures L2, L3 and L5 vertebral

bodies.

 

 

INTERPRETATION:

 

1.  Arteriovascular calcifications soft tissues both groins and thighs.

2.  Generalized osteopenia. No pathologic skeletal lesions.

3.  Symmetric spacing normal-appearing SI and hip joints without arthritic

degenerative change (abnormal lower lumbar spine).

4.  No sign of pelvic or either hip fracture/dislocation.

## 2019-09-04 RX ADMIN — AMOXICILLIN AND CLAVULANATE POTASSIUM SCH TAB: 875; 125 TABLET, FILM COATED ORAL at 21:44

## 2019-09-04 RX ADMIN — OXYCODONE HYDROCHLORIDE AND ACETAMINOPHEN PRN TAB: 5; 325 TABLET ORAL at 08:18

## 2019-09-04 RX ADMIN — OXYCODONE HYDROCHLORIDE AND ACETAMINOPHEN PRN TAB: 5; 325 TABLET ORAL at 14:17

## 2019-09-04 RX ADMIN — AMOXICILLIN AND CLAVULANATE POTASSIUM SCH TAB: 875; 125 TABLET, FILM COATED ORAL at 09:14

## 2019-09-04 RX ADMIN — OXYCODONE HYDROCHLORIDE AND ACETAMINOPHEN PRN TAB: 5; 325 TABLET ORAL at 21:47

## 2019-09-05 LAB
ANION GAP SERPL CALC-SCNC: 12.6 MMOL/L
CHLORIDE SERPL-SCNC: 91 MMOL/L (ref 101–111)
SODIUM SERPL-SCNC: 129 MMOL/L (ref 135–145)

## 2019-09-05 RX ADMIN — AMOXICILLIN AND CLAVULANATE POTASSIUM SCH TAB: 875; 125 TABLET, FILM COATED ORAL at 09:16

## 2019-09-05 RX ADMIN — OXYCODONE HYDROCHLORIDE AND ACETAMINOPHEN PRN TAB: 5; 325 TABLET ORAL at 09:08

## 2019-09-05 RX ADMIN — AMOXICILLIN AND CLAVULANATE POTASSIUM SCH TAB: 875; 125 TABLET, FILM COATED ORAL at 21:07

## 2019-09-05 RX ADMIN — OXYCODONE HYDROCHLORIDE AND ACETAMINOPHEN PRN TAB: 5; 325 TABLET ORAL at 21:17

## 2019-09-05 NOTE — PCM.PN
- General Info


Date of Service: 09/05/19


Admission Dx/Problem (Free Text): 


 Admission Diagnosis/Problem





Admission Diagnosis/Problem      Back pain








Subjective Update: 








The back pain is better controlled. It is moderate, nonradiating. Worse with 

activity.





The facial rash has been driving off. Associated with itching. Started a few 

days ago.





Functional Status: Reports: Pain Controlled





- Review of Systems


Pulmonary: Denies: Shortness of Breath


Cardiovascular: Denies: Chest Pain


Gastrointestinal: Denies: Abdominal Pain


Neurological: Reports: Confusion (Resolved)





- Patient Data


Vitals - Most Recent: 


 Last Vital Signs











Temp  36.2 C   09/05/19 08:00


 


Pulse  77   09/05/19 08:00


 


Resp  14   09/05/19 08:00


 


BP  115/65   09/05/19 09:12


 


Pulse Ox  96   09/05/19 08:00











Weight - Most Recent: 70.715 kg


I&O - Last 24 Hours: 


 Intake & Output











 09/04/19 09/05/19 09/05/19





 22:59 06:59 14:59


 


Intake Total 


 


Output Total 400 150 


 


Balance 











Lab Results Last 24 Hours: 


 Laboratory Results - last 24 hr











  09/04/19 09/04/19 09/05/19 Range/Units





  11:40 16:49 06:30 


 


WBC    6.9  (5.0-10.0)  10^3/uL


 


RBC    3.31 L  (4.6-6.2)  10^6/uL


 


Hgb    10.6 L  (14.0-18.0)  g/dL


 


Hct    31.1 L  (40.0-54.0)  %


 


MCV    94.0  ()  fL


 


MCH    32.0  (27.0-34.0)  pg


 


MCHC    34.1  (33.0-35.0)  g/dL


 


Plt Count    422  (150-450)  10^3/uL


 


Neut % (Auto)    49.7  (42.2-75.2)  %


 


Lymph % (Auto)    39.9  (20.5-50.1)  %


 


Mono % (Auto)    8.4 H  (2-8)  %


 


Eos % (Auto)    1.6  (1.0-3.0)  %


 


Baso % (Auto)    0.4  (0.0-1.0)  %


 


Add Manual Diff    Yes  


 


Neutrophils % (Manual)    56  (42-75)  %


 


Lymphocytes % (Manual)    26  (20-50)  %


 


Atypical Lymphs %    7  %


 


Monocytes % (Manual)    10 H  (2-8)  %


 


Eosinophils % (Manual)    1  (1-3)  %


 


Sodium     (135-145)  mmol/L


 


Potassium     (3.6-5.0)  mmol/L


 


Chloride     (101-111)  mmol/L


 


Carbon Dioxide     (21.0-31.0)  mmol/L


 


Anion Gap     


 


BUN     (7-18)  mg/dL


 


Creatinine     (0.6-1.3)  mg/dL


 


Est Cr Clr Drug Dosing     mL/min


 


Estimated GFR (MDRD)     


 


Glucose     ()  mg/dL


 


POC Glucose  132 H  111 H   ()  mg/dl


 


Calcium     (8.4-10.2)  mg/dl














  09/05/19 09/05/19 Range/Units





  06:30 08:11 


 


WBC    (5.0-10.0)  10^3/uL


 


RBC    (4.6-6.2)  10^6/uL


 


Hgb    (14.0-18.0)  g/dL


 


Hct    (40.0-54.0)  %


 


MCV    ()  fL


 


MCH    (27.0-34.0)  pg


 


MCHC    (33.0-35.0)  g/dL


 


Plt Count    (150-450)  10^3/uL


 


Neut % (Auto)    (42.2-75.2)  %


 


Lymph % (Auto)    (20.5-50.1)  %


 


Mono % (Auto)    (2-8)  %


 


Eos % (Auto)    (1.0-3.0)  %


 


Baso % (Auto)    (0.0-1.0)  %


 


Add Manual Diff    


 


Neutrophils % (Manual)    (42-75)  %


 


Lymphocytes % (Manual)    (20-50)  %


 


Atypical Lymphs %    %


 


Monocytes % (Manual)    (2-8)  %


 


Eosinophils % (Manual)    (1-3)  %


 


Sodium  129 L   (135-145)  mmol/L


 


Potassium  4.6   (3.6-5.0)  mmol/L


 


Chloride  91 L   (101-111)  mmol/L


 


Carbon Dioxide  30.0   (21.0-31.0)  mmol/L


 


Anion Gap  12.6   


 


BUN  26 H   (7-18)  mg/dL


 


Creatinine  1.1   (0.6-1.3)  mg/dL


 


Est Cr Clr Drug Dosing  64.26   mL/min


 


Estimated GFR (MDRD)  > 60   


 


Glucose  107 H   ()  mg/dL


 


POC Glucose   114 H  ()  mg/dl


 


Calcium  9.2   (8.4-10.2)  mg/dl











Med Orders - Current: 


 Current Medications





Acetaminophen (Tylenol)  650 mg PO Q4H PRN


   PRN Reason: Pain (Mild 1-3)/fever


   Last Admin: 09/04/19 10:44 Dose:  650 mg


Acyclovir (Zovirax)  800 mg PO TID [facility]


   Stop: 09/06/19 14:01


   Last Admin: 09/05/19 10:31 Dose:  800 mg


Amoxicillin/Clavulanate Potassium (Augmentin 875 Mg/125 Mg)  1 tab PO Q12HR [facility]


   Stop: 09/08/19 21:01


   Last Admin: 09/05/19 09:16 Dose:  1 tab


Aspirin (Halfprin)  81 mg PO DAILY [facility]


   Last Admin: 09/05/19 09:08 Dose:  81 mg


Atorvastatin Calcium (Lipitor)  10 mg PO DAILY [facility]


   Last Admin: 09/05/19 09:14 Dose:  10 mg


Docusate Sodium (Colace)  100 mg PO BID PRN


   PRN Reason: Constipation


Duloxetine HCl (Cymbalta)  60 mg PO DAILY [facility]


   Last Admin: 09/05/19 09:15 Dose:  60 mg


Enoxaparin Sodium (Lovenox)  40 mg SUBCUT DAILY [facility]


   Last Admin: 09/05/19 09:14 Dose:  40 mg


Gabapentin (Neurontin)  300 mg PO BID [facility]


   Last Admin: 09/05/19 09:14 Dose:  300 mg


Glimepiride (Amaryl)  2 mg PO BIDMEALS [facility]


   Last Admin: 09/05/19 09:13 Dose:  2 mg


Insulin Human Lispro (Humalog)  0 unit SUBCUT TIDMEALS [facility]; Protocol


   Last Admin: 09/05/19 10:03 Dose:  Not Given


Lidocaine (Lidoderm 5%)  700 mg TOP DAILY [facility]


   Last Admin: 09/05/19 10:33 Dose:  700 mg


Losartan Potassium (Cozaar)  100 mg PO DAILY [facility]


   Last Admin: 09/05/19 09:12 Dose:  100 mg


Magnesium Oxide (Magnesium Oxide)  500 mg PO BID@0800,1800 [facility]


   Last Admin: 09/05/19 09:13 Dose:  500 mg


Metformin HCl (Glucophage)  1,000 mg PO BIDMEALS [facility]


   Last Admin: 09/05/19 09:15 Dose:  1,000 mg


Miscellaneous Information (Remove Patch)  1 ea TRDERM BEDTIME [facility]


   Last Admin: 09/04/19 21:46 Dose:  Not Given


Ondansetron HCl (Zofran Odt)  4 mg PO Q6H PRN


   PRN Reason: nausea, able to take PO


Oxycodone/Acetaminophen (Percocet 325-5 Mg)  1 tab PO Q4H PRN


   PRN Reason: Pain (moderate 4-6)


   Last Admin: 09/05/19 09:08 Dose:  1 tab


Sodium Chloride (Sodium Chloride)  1 gm PO TIDMEALS [facility]


   Last Admin: 09/05/19 09:08 Dose:  1 gm


Zolpidem Tartrate (Ambien)  5 mg PO BEDTIME PRN


   PRN Reason: Sleep





Discontinued Medications





Acyclovir (Zovirax)  800 mg PO ONETIME ONE


   Stop: 09/01/19 11:01


   Last Admin: 09/01/19 11:05 Dose:  800 mg


Amoxicillin/Clavulanate Potassium (Augmentin 875 Mg/125 Mg)  1 tab PO ONETIME 

ONE


   Stop: 09/01/19 11:01


   Last Admin: 09/01/19 11:05 Dose:  1 tab


Loperamide HCl (Imodium)  2 mg PO BID [facility]


   Stop: 09/03/19 11:37


   Last Admin: 09/03/19 08:54 Dose:  2 mg


Magnesium Oxide (Magnesium Oxide)  500 mg PO BID@0700,1800 [facility]


   Last Admin: 08/29/19 17:47 Dose:  500 mg











- Exam


General: Alert, Oriented


Cardiovascular: Regular Rate


GI/Abdominal Exam: Normal Bowel Sounds, Soft, Non-Tender


Extremities: No Pedal Edema


Skin: Warm, Other (There is a facial rash, history, drying of blisters not 

crossing the midline, above the eye, forehead)





- Problem List & Annotations


(1) Shingles


SNOMED Code(s): 2438194


   Code(s): B02.9 - ZOSTER WITHOUT COMPLICATIONS   Status: Acute   Current Visit

: Yes   





(2) Compression fx, lumbar spine


SNOMED Code(s): 888785903


   Code(s): S32.000A - WEDGE COMPRESSION FRACTURE OF UNSP LUMBAR VERTEBRA, INIT

   Status: Acute   Current Visit: No   


Qualifiers: 


   Encounter type: initial encounter   Lumbar vertebra fracture level: 

unspecified lumbar vertebra   Qualified Code(s): S32.000A - Wedge compression 

fracture of unspecified lumbar vertebra, initial encounter for closed fracture 

  





- Problem List Review


Problem List Initiated/Reviewed/Updated: Yes





- Plan


Plan:: 








#. Possible facial cellulitis


Likely due to shingles


Patient has erythema at the left periorbital area extending into the scalp


treat with  acyclovir 800 mg 3 times a day


cont  Augmentin for now for a potential bacterial cellulitis


continue  contact isolation





#. Fall with compression fracture


The patient fell and sustained compression fracture of L2 


 confirmed with MRI


Physical and occupational therapy


pain control is good





#. Diabetes mellitus type 2


Patient has been on glimepiride and metformin


Monitor blood sugars 





#. Hypertension


Resume antihypertensive





#. Hyponatremia


Started patient on fluid restriction 1200/day


Salt tablet


Improved


We'll follow periodically





#. Tobacco, alcohol use disorder


Counseling provided

## 2019-09-06 RX ADMIN — OXYCODONE HYDROCHLORIDE AND ACETAMINOPHEN PRN TAB: 5; 325 TABLET ORAL at 16:50

## 2019-09-06 RX ADMIN — OXYCODONE HYDROCHLORIDE AND ACETAMINOPHEN PRN TAB: 5; 325 TABLET ORAL at 21:58

## 2019-09-06 RX ADMIN — OXYCODONE HYDROCHLORIDE AND ACETAMINOPHEN PRN TAB: 5; 325 TABLET ORAL at 08:48

## 2019-09-06 RX ADMIN — Medication PRN APPLIC: at 12:29

## 2019-09-06 RX ADMIN — AMOXICILLIN AND CLAVULANATE POTASSIUM SCH TAB: 875; 125 TABLET, FILM COATED ORAL at 08:46

## 2019-09-06 RX ADMIN — AMOXICILLIN AND CLAVULANATE POTASSIUM SCH TAB: 875; 125 TABLET, FILM COATED ORAL at 21:57

## 2019-09-07 RX ADMIN — AMOXICILLIN AND CLAVULANATE POTASSIUM SCH TAB: 875; 125 TABLET, FILM COATED ORAL at 08:38

## 2019-09-07 RX ADMIN — Medication PRN APPLIC: at 00:01

## 2019-09-07 RX ADMIN — AMOXICILLIN AND CLAVULANATE POTASSIUM SCH TAB: 875; 125 TABLET, FILM COATED ORAL at 21:25

## 2019-09-08 RX ADMIN — OXYCODONE HYDROCHLORIDE AND ACETAMINOPHEN PRN TAB: 5; 325 TABLET ORAL at 08:55

## 2019-09-08 RX ADMIN — AMOXICILLIN AND CLAVULANATE POTASSIUM SCH TAB: 875; 125 TABLET, FILM COATED ORAL at 08:53

## 2019-09-09 RX ADMIN — OXYCODONE HYDROCHLORIDE AND ACETAMINOPHEN PRN TAB: 5; 325 TABLET ORAL at 14:04

## 2019-09-09 RX ADMIN — OXYCODONE HYDROCHLORIDE AND ACETAMINOPHEN PRN TAB: 5; 325 TABLET ORAL at 08:39

## 2019-09-09 RX ADMIN — AMOXICILLIN AND CLAVULANATE POTASSIUM SCH TAB: 875; 125 TABLET, FILM COATED ORAL at 00:28

## 2019-09-10 LAB
ANION GAP SERPL CALC-SCNC: 13.4 MMOL/L
CHLORIDE SERPL-SCNC: 98 MMOL/L (ref 101–111)
SODIUM SERPL-SCNC: 133 MMOL/L (ref 135–145)

## 2019-09-10 RX ADMIN — OXYCODONE HYDROCHLORIDE AND ACETAMINOPHEN PRN TAB: 5; 325 TABLET ORAL at 02:00

## 2019-09-10 RX ADMIN — Medication PRN APPLIC: at 11:49

## 2019-09-10 RX ADMIN — OXYCODONE HYDROCHLORIDE AND ACETAMINOPHEN PRN TAB: 5; 325 TABLET ORAL at 16:21

## 2019-09-10 NOTE — PCM.PN
- General Info


Date of Service: 09/10/19


Admission Dx/Problem (Free Text): 


 Admission Diagnosis/Problem





Admission Diagnosis/Problem      Back pain








Subjective Update: 








The back pain is well controlled. It is moderate, nonradiating. Worse with 

activity.


Has been walking more and more, faster and faster.





The facial rash has been drying off. Associated with itching.








- Review of Systems


General: Denies: Fever


Pulmonary: Denies: Shortness of Breath


Cardiovascular: Denies: Chest Pain, Edema


Skin: Reports: Rash (drying off lesions)


Psychiatric: Denies: Confusion





- Patient Data


Vitals - Most Recent: 


 Last Vital Signs











Temp  36.6 C   09/10/19 08:00


 


Pulse  81   09/10/19 08:00


 


Resp  20   09/10/19 08:00


 


BP  146/89 H  09/10/19 08:31


 


Pulse Ox  99   09/10/19 08:00











Weight - Most Recent: 70.715 kg


I&O - Last 24 Hours: 


 Intake & Output











 09/09/19 09/10/19 09/10/19





 22:59 06:59 14:59


 


Intake Total   350


 


Output Total 175 400 


 


Balance -175 -400 350











Lab Results Last 24 Hours: 


 Laboratory Results - last 24 hr











  09/09/19 09/09/19 09/10/19 Range/Units





  11:46 16:56 06:15 


 


WBC    8.1  (5.0-10.0)  10^3/uL


 


RBC    3.45 L  (4.6-6.2)  10^6/uL


 


Hgb    11.0 L  (14.0-18.0)  g/dL


 


Hct    32.7 L  (40.0-54.0)  %


 


MCV    94.8  ()  fL


 


MCH    31.9  (27.0-34.0)  pg


 


MCHC    33.6  (33.0-35.0)  g/dL


 


Plt Count    579 H D  (150-450)  10^3/uL


 


Neut % (Auto)    60.7  (42.2-75.2)  %


 


Lymph % (Auto)    29.4  (20.5-50.1)  %


 


Mono % (Auto)    7.8  (2-8)  %


 


Eos % (Auto)    1.7  (1.0-3.0)  %


 


Baso % (Auto)    0.4  (0.0-1.0)  %


 


Sodium     (135-145)  mmol/L


 


Potassium     (3.6-5.0)  mmol/L


 


Chloride     (101-111)  mmol/L


 


Carbon Dioxide     (21.0-31.0)  mmol/L


 


Anion Gap     


 


BUN     (7-18)  mg/dL


 


Creatinine     (0.6-1.3)  mg/dL


 


Est Cr Clr Drug Dosing     mL/min


 


Estimated GFR (MDRD)     


 


Glucose     ()  mg/dL


 


POC Glucose  150 H  121 H   ()  mg/dl


 


Calcium     (8.4-10.2)  mg/dl














  09/10/19 09/10/19 Range/Units





  06:15 07:44 


 


WBC    (5.0-10.0)  10^3/uL


 


RBC    (4.6-6.2)  10^6/uL


 


Hgb    (14.0-18.0)  g/dL


 


Hct    (40.0-54.0)  %


 


MCV    ()  fL


 


MCH    (27.0-34.0)  pg


 


MCHC    (33.0-35.0)  g/dL


 


Plt Count    (150-450)  10^3/uL


 


Neut % (Auto)    (42.2-75.2)  %


 


Lymph % (Auto)    (20.5-50.1)  %


 


Mono % (Auto)    (2-8)  %


 


Eos % (Auto)    (1.0-3.0)  %


 


Baso % (Auto)    (0.0-1.0)  %


 


Sodium  133 L   (135-145)  mmol/L


 


Potassium  4.4   (3.6-5.0)  mmol/L


 


Chloride  98 L   (101-111)  mmol/L


 


Carbon Dioxide  26.0   (21.0-31.0)  mmol/L


 


Anion Gap  13.4   


 


BUN  17   (7-18)  mg/dL


 


Creatinine  0.9   (0.6-1.3)  mg/dL


 


Est Cr Clr Drug Dosing  78.54   mL/min


 


Estimated GFR (MDRD)  > 60   


 


Glucose  105   ()  mg/dL


 


POC Glucose   84  ()  mg/dl


 


Calcium  9.4   (8.4-10.2)  mg/dl











Med Orders - Current: 


 Current Medications





Acetaminophen (Tylenol)  650 mg PO Q4H PRN


   PRN Reason: Pain (Mild 1-3)/fever


   Last Admin: 09/09/19 19:26 Dose:  650 mg


Acyclovir (Zovirax)  800 mg PO TID [facility]


   Last Admin: 09/10/19 08:30 Dose:  800 mg


Aspirin (Halfprin)  81 mg PO DAILY [facility]


   Last Admin: 09/10/19 08:32 Dose:  81 mg


Atorvastatin Calcium (Lipitor)  10 mg PO DAILY [facility]


   Last Admin: 09/10/19 08:32 Dose:  10 mg


Docusate Sodium (Colace)  100 mg PO BID PRN


   PRN Reason: Constipation


Duloxetine HCl (Cymbalta)  60 mg PO DAILY [facility]


   Last Admin: 09/10/19 08:32 Dose:  60 mg


Enoxaparin Sodium (Lovenox)  40 mg SUBCUT DAILY [facility]


   Last Admin: 09/10/19 08:33 Dose:  40 mg


Gabapentin (Neurontin)  300 mg PO BID [facility]


   Last Admin: 09/10/19 08:33 Dose:  300 mg


Glimepiride (Amaryl)  2 mg PO BIDMEALS [facility]


   Last Admin: 09/10/19 08:30 Dose:  2 mg


Insulin Human Lispro (Humalog)  0 unit SUBCUT TIDMEALS [facility]; Protocol


   Last Admin: 09/10/19 07:55 Dose:  Not Given


Lidocaine (Lidoderm 5%)  700 mg TOP DAILY [facility]


   Last Admin: 09/10/19 08:33 Dose:  700 mg


Losartan Potassium (Cozaar)  100 mg PO DAILY [facility]


   Last Admin: 09/10/19 08:31 Dose:  100 mg


Magnesium Oxide (Magnesium Oxide)  500 mg PO BID@0800,1800 [facility]


   Last Admin: 09/10/19 08:29 Dose:  500 mg


Metformin HCl (Glucophage)  1,000 mg PO BIDMEALS [facility]


   Last Admin: 09/10/19 08:31 Dose:  1,000 mg


Methyl Salicylate (Icy Hot Cream)  1 gm TOP BID PRN


   PRN Reason: Pain (mild 1-3)


   Last Admin: 09/07/19 00:01 Dose:  1 applic


Miscellaneous Information (Remove Patch)  1 ea TRDERM BEDTIME [facility]


   Last Admin: 09/09/19 20:57 Dose:  Not Given


Ondansetron HCl (Zofran Odt)  4 mg PO Q6H PRN


   PRN Reason: nausea, able to take PO


Oxycodone/Acetaminophen (Percocet 325-5 Mg)  1 tab PO Q4H PRN


   PRN Reason: Pain (moderate 4-6)


   Last Admin: 09/10/19 02:00 Dose:  1 tab


Sodium Chloride (Sodium Chloride)  1 gm PO TIDMEALS [facility]


   Last Admin: 09/10/19 08:30 Dose:  1 gm


Zolpidem Tartrate (Ambien)  5 mg PO BEDTIME PRN


   PRN Reason: Sleep





Discontinued Medications





Acyclovir (Zovirax)  800 mg PO ONETIME ONE


   Stop: 09/01/19 11:01


   Last Admin: 09/01/19 11:05 Dose:  800 mg


Amoxicillin/Clavulanate Potassium (Augmentin 875 Mg/125 Mg)  1 tab PO Q12HR [facility]


   Stop: 09/08/19 21:01


   Last Admin: 09/09/19 00:28 Dose:  1 tab


Amoxicillin/Clavulanate Potassium (Augmentin 875 Mg/125 Mg)  1 tab PO ONETIME 

ONE


   Stop: 09/01/19 11:01


   Last Admin: 09/01/19 11:05 Dose:  1 tab


Loperamide HCl (Imodium)  2 mg PO BID [facility]


   Stop: 09/03/19 11:37


   Last Admin: 09/03/19 08:54 Dose:  2 mg


Magnesium Oxide (Magnesium Oxide)  500 mg PO BID@0700,1800 [facility]


   Last Admin: 08/29/19 17:47 Dose:  500 mg











- Exam


General: Alert, Oriented


HEENT: EOMI


Neck: Supple


Lungs: Normal Respiratory Effort, Decreased Breath Sounds


Cardiovascular: Regular Rate, Regular Rhythm


GI/Abdominal Exam: Normal Bowel Sounds, Soft, Non-Tender


Extremities: No Pedal Edema





- Problem List & Annotations


(1) Shingles


SNOMED Code(s): 7418221


   Code(s): B02.9 - ZOSTER WITHOUT COMPLICATIONS   Status: Acute   Current Visit

: Yes   





(2) Compression fx, lumbar spine


SNOMED Code(s): 690297274


   Code(s): S32.000A - WEDGE COMPRESSION FRACTURE OF UNSP LUMBAR VERTEBRA, INIT

   Status: Acute   Current Visit: No   


Qualifiers: 


   Encounter type: initial encounter   Lumbar vertebra fracture level: 

unspecified lumbar vertebra   Qualified Code(s): S32.000A - Wedge compression 

fracture of unspecified lumbar vertebra, initial encounter for closed fracture 

  





- Problem List Review


Problem List Initiated/Reviewed/Updated: Yes





- Plan


Plan:: 








#. Possible facial cellulitis


Likely due to shingles


Patient has erythema at the left black/supraorbital area extending into the scalp


treat with acyclovir 800 mg 3 times a day


stop  Augmentin that was used for a potential bacterial cellulitis


continue contact isolation





#. Fall with compression fracture


The patient fell and sustained compression fracture of L2 


 confirmed with MRI


Physical and occupational therapy


pain control is good





#. Diabetes mellitus type 2


cont on glimepiride and metformin


Monitor blood sugars 





#. Hypertension


cont cozaar





#. Hyponatremia


Started patient on fluid restriction 1200/day


Salt tablet


Improved


We'll follow periodically





#. Tobacco, alcohol use disorder


Counseling provided

## 2019-09-11 RX ADMIN — OXYCODONE HYDROCHLORIDE AND ACETAMINOPHEN PRN TAB: 5; 325 TABLET ORAL at 11:11

## 2019-09-11 RX ADMIN — Medication PRN APPLIC: at 11:11

## 2019-09-11 RX ADMIN — OXYCODONE HYDROCHLORIDE AND ACETAMINOPHEN PRN TAB: 5; 325 TABLET ORAL at 17:16

## 2019-09-11 RX ADMIN — OXYCODONE HYDROCHLORIDE AND ACETAMINOPHEN PRN TAB: 5; 325 TABLET ORAL at 05:29

## 2019-09-11 RX ADMIN — OXYCODONE HYDROCHLORIDE AND ACETAMINOPHEN PRN TAB: 5; 325 TABLET ORAL at 01:27

## 2019-09-12 RX ADMIN — OXYCODONE HYDROCHLORIDE AND ACETAMINOPHEN PRN TAB: 5; 325 TABLET ORAL at 00:26

## 2019-09-12 RX ADMIN — OXYCODONE HYDROCHLORIDE AND ACETAMINOPHEN PRN TAB: 5; 325 TABLET ORAL at 06:21

## 2019-09-12 NOTE — [id number].[id number]
**Discharge Summary





- Hospital Course


Free Text/Narrative:: 





The patient is a 64-year-old man with medical history of diabetes mellitus type 

2, chronic low back pain, previous compression fracture. HE had a fall and was 

hospitalized at [facility]. Was transferred to [facility] for PT/OT and pain 

management of back pain. Had shingles and was started on acyclovir. He improved 

during his stay here and is OK to return home. He will need a walker based on 

PT eval and this was prescribed. Follow up with PCP


Diagnosis: Stroke: No


Modified Genny Scale: No Symptoms at All


Modified Dawes Scale Score: 0





- Discharge Data


Discharge Date: 09/12/19


Discharge Disposition: Home, Self-Care 01


Condition: Good





- Referral to Home Health


Primary Care Physician: 


PCP None








- Patient Summary/Data


Consults: 


 Consultations





08/29/19 11:07


OT Evaluation and Treatment [CONS] Routine 


PT Evaluation and Treatment [CONS] Routine 














- Patient Instructions


Diet: Usual Diet as Tolerated


Activity: As Tolerated





- Discharge Plan


Prescriptions/Med Rec: 


Acetaminophen/oxyCODONE [Percocet 325-5 MG] 1 tab PO Q4H PRN 7 Days #20 tablet


 PRN Reason: Pain (Moderate 4-6)


Home Medications: 


 Home Meds





DULoxetine [Cymbalta] 60 mg PO DAILY 06/16/18 [History]


Glimepiride [Amaryl] 2 mg PO BIDMEALS 06/16/18 [History]


Losartan [Cozaar] 100 mg PO DAILY 06/16/18 [History]


atorvaSTATin [Lipitor] 10 mg PO DAILY 06/16/18 [History]


metFORMIN HCl [Glucophage] 1,000 mg PO BIDMEALS 06/16/18 [History]


Aspirin [Halfprin] 81 mg PO DAILY 08/29/19 [History]


Gabapentin [Neurontin] 300 mg PO BID 08/29/19 [History]


Magnesium Oxide [Magnesium] 400 mg PO BID 08/29/19 [History]


Sodium Chloride 1 gm PO TID 08/29/19 [History]


Acetaminophen/oxyCODONE [Percocet 325-5 MG] 1 tab PO Q4H PRN 7 Days #20 tablet 

09/12/19 [Rx]








Patient Handouts:  Acetaminophen; Oxycodone tablets, Shingles, Easy-to-Read, 

Chronic Back Pain, Easy-to-Read, Spinal Compression Fracture





- Discharge Summary/Plan Comment


DC Time >30 min.: Yes





- General Info


Date of Service: 09/12/19


Admission Dx/Problem (Free Text: 


 Admission Diagnosis/Problem





Admission Diagnosis/Problem      Back pain








Subjective Update: 








The back pain is well controlled











Functional Status: Reports: Ambulating





- Review of Systems


General: Reports: No Symptoms


HEENT: Reports: No Symptoms


Pulmonary: Reports: No Symptoms


Cardiovascular: Reports: No Symptoms


Gastrointestinal: Reports: No Symptoms


Genitourinary: Reports: No Symptoms


Musculoskeletal: Reports: No Symptoms





- Patient Data


Vitals - Most Recent: 


 Last Vital Signs











Temp  36.2 C   09/12/19 07:39


 


Pulse  69   09/12/19 07:39


 


Resp  20   09/12/19 07:39


 


BP  130/67   09/12/19 08:40


 


Pulse Ox  98   09/12/19 07:39











Weight - Most Recent: 69.763 kg


I&O - Last 24 hours: 


 Intake & Output











 09/11/19 09/12/19 09/12/19





 22:59 06:59 14:59


 


Intake Total 1050 50 220


 


Balance 1050 50 220











Lab Results - Last 24 hrs: 


 Laboratory Results - last 24 hr











  09/11/19 09/11/19 09/12/19 Range/Units





  11:20 17:08 07:52 


 


POC Glucose  169 H  126 H  93  ()  mg/dl











Med Orders - Current: 


 Current Medications





Acetaminophen (Tylenol)  650 mg PO Q4H PRN


   PRN Reason: Pain (Mild 1-3)/fever


   Last Admin: 09/09/19 19:26 Dose:  650 mg


Acyclovir (Zovirax)  800 mg PO TID [facility]


   Last Admin: 09/12/19 08:38 Dose:  800 mg


Aspirin (Halfprin)  81 mg PO DAILY [facility]


   Last Admin: 09/12/19 08:38 Dose:  81 mg


Atorvastatin Calcium (Lipitor)  10 mg PO DAILY [facility]


   Last Admin: 09/12/19 08:39 Dose:  10 mg


Docusate Sodium (Colace)  100 mg PO BID PRN


   PRN Reason: Constipation


Duloxetine HCl (Cymbalta)  60 mg PO DAILY [facility]


   Last Admin: 09/12/19 08:39 Dose:  60 mg


Enoxaparin Sodium (Lovenox)  40 mg SUBCUT DAILY [facility]


   Last Admin: 09/12/19 08:40 Dose:  40 mg


Gabapentin (Neurontin)  300 mg PO BID [facility]


   Last Admin: 09/12/19 08:39 Dose:  300 mg


Glimepiride (Amaryl)  2 mg PO BIDMEALS [facility]


   Last Admin: 09/12/19 08:39 Dose:  2 mg


Insulin Human Lispro (Humalog)  0 unit SUBCUT TIDMEALS [facility]; Protocol


   Last Admin: 09/12/19 08:14 Dose:  Not Given


Lidocaine (Lidoderm 5%)  700 mg TOP DAILY [facility]


   Last Admin: 09/12/19 08:46 Dose:  700 mg


Losartan Potassium (Cozaar)  100 mg PO DAILY [facility]


   Last Admin: 09/12/19 08:40 Dose:  100 mg


Magnesium Oxide (Magnesium Oxide)  500 mg PO BID@0800,1800 [facility]


   Last Admin: 09/12/19 08:38 Dose:  500 mg


Metformin HCl (Glucophage)  1,000 mg PO BIDMEALS [facility]


   Last Admin: 09/12/19 08:39 Dose:  1,000 mg


Methyl Salicylate (Icy Hot Cream)  1 gm TOP BID PRN


   PRN Reason: Pain (mild 1-3)


   Last Admin: 09/11/19 11:11 Dose:  1 applic


Miscellaneous Information (Remove Patch)  1 ea TRDERM BEDTIME [facility]


   Last Admin: 09/11/19 20:45 Dose:  Not Given


Ondansetron HCl (Zofran Odt)  4 mg PO Q6H PRN


   PRN Reason: nausea, able to take PO


Oxycodone/Acetaminophen (Percocet 325-5 Mg)  1 tab PO Q4H PRN


   PRN Reason: Pain (moderate 4-6)


   Last Admin: 09/12/19 06:21 Dose:  1 tab


Sodium Chloride (Sodium Chloride)  1 gm PO TIDMEALS [facility]


   Last Admin: 09/12/19 08:40 Dose:  1 gm


Zolpidem Tartrate (Ambien)  5 mg PO BEDTIME PRN


   PRN Reason: Sleep





Discontinued Medications





Acyclovir (Zovirax)  800 mg PO ONETIME ONE


   Stop: 09/01/19 11:01


   Last Admin: 09/01/19 11:05 Dose:  800 mg


Amoxicillin/Clavulanate Potassium (Augmentin 875 Mg/125 Mg)  1 tab PO Q12HR [facility]


   Stop: 09/08/19 21:01


   Last Admin: 09/09/19 00:28 Dose:  1 tab


Amoxicillin/Clavulanate Potassium (Augmentin 875 Mg/125 Mg)  1 tab PO ONETIME 

ONE


   Stop: 09/01/19 11:01


   Last Admin: 09/01/19 11:05 Dose:  1 tab


Loperamide HCl (Imodium)  2 mg PO BID [facility]


   Stop: 09/03/19 11:37


   Last Admin: 09/03/19 08:54 Dose:  2 mg


Magnesium Oxide (Magnesium Oxide)  500 mg PO BID@0700,1800 [facility]


   Last Admin: 08/29/19 17:47 Dose:  500 mg











- Exam


General: Reports: Alert, Oriented


HEENT: Reports: Pupils Equal, Pupils Reactive


Lungs: Reports: Clear to Auscultation, Normal Respiratory Effort


Cardiovascular: Reports: Regular Rate, Regular Rhythm


GI/Abdominal Exam: Normal Bowel Sounds, Soft, Non-Tender, No Organomegaly


Extremities: Normal Inspection, Normal Range of Motion, Non-Tender, No Pedal 

Edema

## 2019-09-18 ENCOUNTER — HOSPITAL ENCOUNTER (OUTPATIENT)
Dept: HOSPITAL 43 - DL.ED | Age: 64
Setting detail: OBSERVATION
LOS: 2 days | Discharge: NURSING, REHAB OR LONG-TERM CARE | End: 2019-09-20
Attending: HOSPITALIST | Admitting: HOSPITALIST
Payer: MEDICAID

## 2019-09-18 DIAGNOSIS — R29.6: Primary | ICD-10-CM

## 2019-09-18 DIAGNOSIS — F32.9: ICD-10-CM

## 2019-09-18 DIAGNOSIS — R41.0: ICD-10-CM

## 2019-09-18 DIAGNOSIS — M54.9: ICD-10-CM

## 2019-09-18 DIAGNOSIS — Z79.899: ICD-10-CM

## 2019-09-18 DIAGNOSIS — Z79.1: ICD-10-CM

## 2019-09-18 DIAGNOSIS — R51: ICD-10-CM

## 2019-09-18 DIAGNOSIS — Z87.891: ICD-10-CM

## 2019-09-18 DIAGNOSIS — F41.9: ICD-10-CM

## 2019-09-18 DIAGNOSIS — I10: ICD-10-CM

## 2019-09-18 DIAGNOSIS — E11.9: ICD-10-CM

## 2019-09-18 DIAGNOSIS — Z79.82: ICD-10-CM

## 2019-09-18 DIAGNOSIS — Z79.84: ICD-10-CM

## 2019-09-18 DIAGNOSIS — E78.00: ICD-10-CM

## 2019-09-18 LAB
ANION GAP SERPL CALC-SCNC: 18.9 MMOL/L
CHLORIDE SERPL-SCNC: 97 MMOL/L (ref 101–111)
SODIUM SERPL-SCNC: 137 MMOL/L (ref 135–145)

## 2019-09-18 PROCEDURE — 83735 ASSAY OF MAGNESIUM: CPT

## 2019-09-18 PROCEDURE — G0378 HOSPITAL OBSERVATION PER HR: HCPCS

## 2019-09-18 PROCEDURE — 80053 COMPREHEN METABOLIC PANEL: CPT

## 2019-09-18 PROCEDURE — 84484 ASSAY OF TROPONIN QUANT: CPT

## 2019-09-18 PROCEDURE — 93005 ELECTROCARDIOGRAM TRACING: CPT

## 2019-09-18 PROCEDURE — 80305 DRUG TEST PRSMV DIR OPT OBS: CPT

## 2019-09-18 PROCEDURE — 85025 COMPLETE CBC W/AUTO DIFF WBC: CPT

## 2019-09-18 PROCEDURE — 84100 ASSAY OF PHOSPHORUS: CPT

## 2019-09-18 PROCEDURE — 36415 COLL VENOUS BLD VENIPUNCTURE: CPT

## 2019-09-18 PROCEDURE — 70450 CT HEAD/BRAIN W/O DYE: CPT

## 2019-09-18 PROCEDURE — 80048 BASIC METABOLIC PNL TOTAL CA: CPT

## 2019-09-18 PROCEDURE — 99285 EMERGENCY DEPT VISIT HI MDM: CPT

## 2019-09-18 PROCEDURE — 72125 CT NECK SPINE W/O DYE: CPT

## 2019-09-18 PROCEDURE — 87040 BLOOD CULTURE FOR BACTERIA: CPT

## 2019-09-18 PROCEDURE — 82962 GLUCOSE BLOOD TEST: CPT

## 2019-09-18 PROCEDURE — G0480 DRUG TEST DEF 1-7 CLASSES: HCPCS

## 2019-09-18 PROCEDURE — 97165 OT EVAL LOW COMPLEX 30 MIN: CPT

## 2019-09-18 PROCEDURE — 83605 ASSAY OF LACTIC ACID: CPT

## 2019-09-18 PROCEDURE — 85027 COMPLETE CBC AUTOMATED: CPT

## 2019-09-18 PROCEDURE — 80320 DRUG SCREEN QUANTALCOHOLS: CPT

## 2019-09-18 PROCEDURE — 72100 X-RAY EXAM L-S SPINE 2/3 VWS: CPT

## 2019-09-18 PROCEDURE — 81001 URINALYSIS AUTO W/SCOPE: CPT

## 2019-09-18 RX ADMIN — OXYCODONE HYDROCHLORIDE AND ACETAMINOPHEN PRN TAB: 5; 325 TABLET ORAL at 23:47

## 2019-09-18 NOTE — PCM.SN
- Free Text/Narrative


Note: 





Hx of chronic alcoholism


Alcohol level of 50, likely still drinks alcohol actively


Will monitor with [facility] protocol for alcohol withdrawal


oral thiamine supplementation

## 2019-09-18 NOTE — PCM.HP
H&P History of Present Illness





- General


Date of Service: 09/18/19


Admit Problem/Dx: 


 Admission Diagnosis/Problem





Admission Diagnosis/Problem      Falls








Source of Information: Patient


History Limitations: Reports: Other (possible dementia)





- History of Present Illness


Initial Comments - Free Text/Narative: 





65 yo M with PMH of htn, dm2, alcohol abuse disorder, anxiety/depression, 

possible dementia who presents with falls, confusion. 





History is limited by patient's confusion which could be the result of dementia.


Patient cannot remember clear details of why he came to the ED. 


He only remembers falling several times at home. 


He complains of headache and backpain, but cannot give details. 


No chest pain, no SOB, no abdominal pain, no urinary symptoms. 


A review of his chart shows a several similar presentations in the past.


Claims his last alcohol drink was several days ago





In the ED, labs showed mildly elevated lactate, elevated ALP. 


Imaging of the brain showed diffuse atrophy and microvascular changes. 


CT spine imaging showed no fractures and imaging of the lumbar vertebra showed 

old compression # unchanged from previous. 


  ** Left Anterior Head


Pain Score (Numeric/FACES): 5





- Related Data


Allergies/Adverse Reactions: 


 Allergies











Allergy/AdvReac Type Severity Reaction Status Date / Time


 


No Known Allergies Allergy   Verified 09/18/19 14:42











Home Medications: 


 Home Meds





DULoxetine [Cymbalta] 60 mg PO DAILY 06/16/18 [History]


Glimepiride [Amaryl] 2 mg PO BIDMEALS 06/16/18 [History]


Losartan [Cozaar] 100 mg PO DAILY 06/16/18 [History]


atorvaSTATin [Lipitor] 10 mg PO DAILY 06/16/18 [History]


metFORMIN HCl [Glucophage] 1,000 mg PO BIDMEALS 06/16/18 [History]


Aspirin [Halfprin] 81 mg PO DAILY 08/29/19 [History]


Gabapentin [Neurontin] 300 mg PO BID 08/29/19 [History]


Magnesium Oxide [Magnesium] 400 mg PO BID 08/29/19 [History]


Sodium Chloride 1 gm PO TID 08/29/19 [History]


Acetaminophen/oxyCODONE [Percocet 325-5 MG] 1 tab PO Q4H PRN 7 Days #20 tablet 

09/12/19 [Rx]


Meloxicam 7.5 mg PO BID 09/18/19 [History]


hydroCHLOROthiazide [Hydrochlorothiazide] 25 mg PO DAILY 09/18/19 [History]











Past Medical History


HEENT History: Reports: None


Cardiovascular History: Reports: High Cholesterol, Hypertension


Respiratory History: Reports: None


Gastrointestinal History: Reports: Other (See Below)


Other Gastrointestinal History: c-diff


Genitourinary History: Reports: None


Musculoskeletal History: Reports: Osteoarthritis


Other Musculoskeletal History: COMPRESSION FRACTURE t12, L1, L5, nerve pain


Neurological History: Reports: Other (See Below)


Other Neuro History: BILATERAL HAND PAIN, RESTLESS LEGS


Psychiatric History: Reports: Addiction, Depression


Endocrine/Metabolic History: Reports: Diabetes, Type II


Hematologic History: Reports: None


Immunologic History: Reports: None


Oncologic (Cancer) History: Reports: None


Dermatologic History: Reports: None





- Infectious Disease History


Infectious Disease History: Reports: C-Difficile





- Past Surgical History


Head Surgeries/Procedures: Reports: None





Social & Family History





- Family History


Family Medical History: Noncontributory





- Tobacco Use


Smoking Status *Q: Former Smoker


Used Tobacco, but Quit: Yes


Month/Year Tobacco Last Used: 2009





- Caffeine Use


Caffeine Use: Reports: Coffee





- Alcohol Use


Days Per Week of Alcohol Use: 7


Number of Drinks Per Day: 2


Total Drinks Per Week: 14





- Recreational Drug Use


Recreational Drug Use: No


Drug Use in Last 12 Months: Yes


Recreational Drug Type: Reports: Marijuana/Hashish





H&P Review of Systems





- Review of Systems:


Review Of Systems: ROS reveals no pertinent complaints other than HPI.


General: Reports: No Symptoms.  Denies: Fever


HEENT: Reports: No Symptoms


Pulmonary: Reports: No Symptoms


Cardiovascular: Reports: No Symptoms


Gastrointestinal: Reports: No Symptoms


Genitourinary: Reports: No Symptoms


Musculoskeletal: Reports: Back Pain


Skin: Reports: No Symptoms


Psychiatric: Reports: No Symptoms


Neurological: Reports: Headache





Exam





- Exam


Exam: See Below





- Vital Signs


Vital Signs: 


 Last Vital Signs











Temp  36.8 C   09/18/19 17:04


 


Pulse  90   09/18/19 17:04


 


Resp  18   09/18/19 17:04


 


BP  166/77 H  09/18/19 17:04


 


Pulse Ox  96   09/18/19 17:04











Weight: 67.041 kg





- Exam


General: Other (AAO x 2, disoriented to time)


HEENT: Conjunctiva Clear


Neck: Supple, Trachea Midline


Lungs: Clear to Auscultation, Normal Respiratory Effort


Cardiovascular: Regular Rate, Regular Rhythm, Normal S1, Normal S2


GI/Abdominal Exam: Normal Bowel Sounds, Soft, Non-Tender, No Organomegaly


Extremities: Normal Inspection, Normal Range of Motion, Non-Tender, No Pedal 

Edema


Neurological: Other (no focal neuro deficits, moves all ext)





- Patient Data


Lab Results Last 24 hrs: 


 Laboratory Results - last 24 hr











  09/18/19 09/18/19 09/18/19 Range/Units





  14:31 14:34 14:34 


 


WBC  7.1    (5.0-10.0)  10^3/uL


 


RBC  3.83 L    (4.6-6.2)  10^6/uL


 


Hgb  12.3 L    (14.0-18.0)  g/dL


 


Hct  36.3 L    (40.0-54.0)  %


 


MCV  94.8    ()  fL


 


MCH  32.1    (27.0-34.0)  pg


 


MCHC  33.9    (33.0-35.0)  g/dL


 


Plt Count  518 H    (150-450)  10^3/uL


 


Neut % (Auto)  56.3    (42.2-75.2)  %


 


Lymph % (Auto)  33.9    (20.5-50.1)  %


 


Mono % (Auto)  7.8    (2-8)  %


 


Eos % (Auto)  1.3    (1.0-3.0)  %


 


Baso % (Auto)  0.7    (0.0-1.0)  %


 


Add Manual Diff  Yes    


 


Neutrophils % (Manual)  54    (42-75)  %


 


Lymphocytes % (Manual)  38    (20-50)  %


 


Monocytes % (Manual)  3    (2-8)  %


 


Eosinophils % (Manual)  3    (1-3)  %


 


Basophils % (Manual)  2    


 


Sodium   137   (135-145)  mmol/L


 


Potassium   3.9   (3.6-5.0)  mmol/L


 


Chloride   97 L   (101-111)  mmol/L


 


Carbon Dioxide   25.0   (21.0-31.0)  mmol/L


 


Anion Gap   18.9   


 


BUN   11   (7-18)  mg/dL


 


Creatinine   0.9   (0.6-1.3)  mg/dL


 


Est Cr Clr Drug Dosing   81.50   mL/min


 


Estimated GFR (MDRD)   > 60   


 


BUN/Creatinine Ratio   12.22   


 


Glucose   133 H   ()  mg/dL


 


Lactic Acid    2.7 H  (0.5-2.2)  mmol/L


 


Calcium   9.5   (8.4-10.2)  mg/dl


 


Total Bilirubin   1.1 H   (0.2-1.0)  mg/dL


 


AST   36   (10-42)  IU/L


 


ALT   35   (10-60)  IU/L


 


Alkaline Phosphatase   148 H   ()  IU/L


 


Troponin I   < 0.02   (0.00-0.02)  ng/ml


 


Total Protein   8.3 H   (6.7-8.2)  g/dl


 


Albumin   4.3   (3.2-5.5)  g/dl


 


Globulin   4.0   


 


Albumin/Globulin Ratio   1.08   


 


Urine Color     (YELLOW)  


 


Urine Appearance     (CLEAR)  


 


Urine pH     (5.0-9.0)  


 


Ur Specific Gravity     (1.005-1.030)  


 


Urine Protein     (NEGATIVE)  


 


Urine Glucose (UA)     (NEGATIVE)  


 


Urine Ketones     (NEGATIVE)  


 


Urine Occult Blood     (NEGATIVE)  


 


Urine Nitrite     (NEGATIVE)  


 


Urine Bilirubin     (NEGATIVE)  


 


Urine Urobilinogen     (0.2-1.0)  mg/dL


 


Ur Leukocyte Esterase     (NEGATIVE)  


 


Urine RBC     /HPF


 


Urine WBC     (0-5/HPF)  /HPF


 


Ur Epithelial Cells     (NOT SEEN)  /HPF


 


Urine Bacteria     (0-FEW/HPF)  /HPF


 


Urine Opiates Screen     (NEGATIVE)  


 


Ur Oxycodone Screen     (NEGATIVE)  


 


Urine Methadone Screen     (NEGATIVE)  


 


Ur Barbiturates Screen     (NEGATIVE)  


 


U Tricyclic Antidepress     (NEGATIVE)  


 


Ur Phencyclidine Scrn     (NEGATIVE)  


 


Ur Amphetamine Screen     (NEGATIVE)  


 


U Methamphetamines Scrn     (NEGATIVE)  


 


Urine MDMA Screen     (NEGATIVE)  


 


U Benzodiazepines Scrn     (NEGATIVE)  


 


Urine Cocaine Screen     (NEGATIVE)  


 


U Marijuana (THC) Screen     (NEGATIVE)  


 


Ethyl Alcohol   50   mg/dL














  09/18/19 09/18/19 Range/Units





  14:50 14:50 


 


WBC    (5.0-10.0)  10^3/uL


 


RBC    (4.6-6.2)  10^6/uL


 


Hgb    (14.0-18.0)  g/dL


 


Hct    (40.0-54.0)  %


 


MCV    ()  fL


 


MCH    (27.0-34.0)  pg


 


MCHC    (33.0-35.0)  g/dL


 


Plt Count    (150-450)  10^3/uL


 


Neut % (Auto)    (42.2-75.2)  %


 


Lymph % (Auto)    (20.5-50.1)  %


 


Mono % (Auto)    (2-8)  %


 


Eos % (Auto)    (1.0-3.0)  %


 


Baso % (Auto)    (0.0-1.0)  %


 


Add Manual Diff    


 


Neutrophils % (Manual)    (42-75)  %


 


Lymphocytes % (Manual)    (20-50)  %


 


Monocytes % (Manual)    (2-8)  %


 


Eosinophils % (Manual)    (1-3)  %


 


Basophils % (Manual)    


 


Sodium    (135-145)  mmol/L


 


Potassium    (3.6-5.0)  mmol/L


 


Chloride    (101-111)  mmol/L


 


Carbon Dioxide    (21.0-31.0)  mmol/L


 


Anion Gap    


 


BUN    (7-18)  mg/dL


 


Creatinine    (0.6-1.3)  mg/dL


 


Est Cr Clr Drug Dosing    mL/min


 


Estimated GFR (MDRD)    


 


BUN/Creatinine Ratio    


 


Glucose    ()  mg/dL


 


Lactic Acid    (0.5-2.2)  mmol/L


 


Calcium    (8.4-10.2)  mg/dl


 


Total Bilirubin    (0.2-1.0)  mg/dL


 


AST    (10-42)  IU/L


 


ALT    (10-60)  IU/L


 


Alkaline Phosphatase    ()  IU/L


 


Troponin I    (0.00-0.02)  ng/ml


 


Total Protein    (6.7-8.2)  g/dl


 


Albumin    (3.2-5.5)  g/dl


 


Globulin    


 


Albumin/Globulin Ratio    


 


Urine Color  Yellow   (YELLOW)  


 


Urine Appearance  Clear   (CLEAR)  


 


Urine pH  5.5   (5.0-9.0)  


 


Ur Specific Gravity  1.020   (1.005-1.030)  


 


Urine Protein  Negative   (NEGATIVE)  


 


Urine Glucose (UA)  100 H   (NEGATIVE)  


 


Urine Ketones  Negative   (NEGATIVE)  


 


Urine Occult Blood  Trace-intact H   (NEGATIVE)  


 


Urine Nitrite  Negative   (NEGATIVE)  


 


Urine Bilirubin  Negative   (NEGATIVE)  


 


Urine Urobilinogen  1.0   (0.2-1.0)  mg/dL


 


Ur Leukocyte Esterase  Negative   (NEGATIVE)  


 


Urine RBC  0-5   /HPF


 


Urine WBC  Not seen   (0-5/HPF)  /HPF


 


Ur Epithelial Cells  Not seen   (NOT SEEN)  /HPF


 


Urine Bacteria  Rare   (0-FEW/HPF)  /HPF


 


Urine Opiates Screen   Negative  (NEGATIVE)  


 


Ur Oxycodone Screen   Negative  (NEGATIVE)  


 


Urine Methadone Screen   Negative  (NEGATIVE)  


 


Ur Barbiturates Screen   Negative  (NEGATIVE)  


 


U Tricyclic Antidepress   Negative  (NEGATIVE)  


 


Ur Phencyclidine Scrn   Negative  (NEGATIVE)  


 


Ur Amphetamine Screen   Negative  (NEGATIVE)  


 


U Methamphetamines Scrn   Negative  (NEGATIVE)  


 


Urine MDMA Screen   Negative  (NEGATIVE)  


 


U Benzodiazepines Scrn   Negative  (NEGATIVE)  


 


Urine Cocaine Screen   Negative  (NEGATIVE)  


 


U Marijuana (THC) Screen   Negative  (NEGATIVE)  


 


Ethyl Alcohol    mg/dL











Result Diagrams: 


 09/18/19 14:31





 09/18/19 14:34


Problem List Initiated/Reviewed/Updated: Yes


Orders Last 24hrs: 


 Active Orders 24 hr











 Category Date Time Status


 


 Admission Diagnosis [ADT] Routine ADT  09/18/19 17:32 Ordered


 


 Admission Status [Patient Status] [ADT] Routine ADT  09/18/19 17:32 Active


 


 Accu Check [Blood Glucose Check, Bedside] [RC] Care  09/18/19 18:16 Active





 QIDACANDBED   


 


 Ambulate [RC] ASDIRECTED Care  09/18/19 18:13 Active


 


 Height and Weight [RC] DAILY Care  09/18/19 18:13 Active


 


 Oxygen Therapy [RC] .PRN Care  09/18/19 18:13 Active


 


 Peripheral IV Care [RC] .AS DIRECTED Care  09/18/19 14:00 Active


 


 Up With Assistance [RC] ASDIRECTED Care  09/18/19 18:13 Active


 


 VTE/DVT Education [RC] PER UNIT ROUTINE Care  09/18/19 18:13 Active


 


 Vital Signs [RC] Q4H Care  09/18/19 18:13 Active


 


 OT Evaluation and Treatment [CONS] Routine Cons  09/18/19 18:13 Active


 


 PT Evaluation and Treatment [CONS] Routine Cons  09/18/19 18:13 Active


 


 Consistent Carbohydrate Diet [DIET] Diet  09/19/19 Dinner Ordered


 


 Cervical Spine wo Cont [CT] Urgent Exams  09/18/19 16:20 Taken


 


 Lumbar Spine 2 or 3V [CR] Urgent Exams  09/18/19 16:20 Taken


 


 BASIC METABOLIC PANEL,BMP [CHEM] AM Lab  09/19/19 05:11 Ordered


 


 BASIC METABOLIC PANEL,BMP [CHEM] AM Lab  09/20/19 05:11 Ordered


 


 BASIC METABOLIC PANEL,BMP [CHEM] AM Lab  09/21/19 05:11 Ordered


 


 CBC W/O DIFF,HEMOGRAM [HEME] AM Lab  09/19/19 05:11 Ordered


 


 CBC W/O DIFF,HEMOGRAM [HEME] AM Lab  09/20/19 05:11 Ordered


 


 CBC W/O DIFF,HEMOGRAM [HEME] AM Lab  09/21/19 05:11 Ordered


 


 CULTURE BLOOD [BC] Stat Lab  09/18/19 14:25 Received


 


 CULTURE BLOOD [BC] Stat Lab  09/18/19 14:34 Received


 


 MAGNESIUM [CHEM] AM Lab  09/19/19 05:11 Ordered


 


 MAGNESIUM [CHEM] AM Lab  09/20/19 05:11 Ordered


 


 MAGNESIUM [CHEM] AM Lab  09/21/19 05:11 Ordered


 


 PHOSPHORUS [CHEM] AM Lab  09/19/19 05:11 Ordered


 


 PHOSPHORUS [CHEM] AM Lab  09/20/19 05:11 Ordered


 


 PHOSPHORUS [CHEM] AM Lab  09/21/19 05:11 Ordered


 


 Acetaminophen/oxyCODONE [Percocet 325-5 MG] Med  09/18/19 18:15 Ordered





 1 tab PO Q4H PRN   


 


 Aspirin [Halfprin] Med  09/19/19 09:00 Ordered





 81 mg PO DAILY   


 


 Calcium Carbonate/Vitamin D3 [Calcium Carbonate/Vitamin Med  09/19/19 08:00 

Ordered





 D 1250 MG-200 Unit]   





 1 tab PO BIDMEALS   


 


 DULoxetine [Cymbalta] Med  09/19/19 09:00 Ordered





 60 mg PO DAILY   


 


 Enoxaparin [Lovenox] Med  09/18/19 18:30 Ordered





 40 mg SUBCUT DAILY   


 


 Gabapentin [Neurontin] Med  09/18/19 21:00 Ordered





 300 mg PO BID   


 


 Glimepiride [Amaryl] Med  09/19/19 08:00 Ordered





 2 mg PO BIDMEALS   


 


 Insulin Lispro [HumaLOG] Med  09/18/19 21:00 Ordered





 See Protocol  SUBCUT QIDACANDBED   


 


 LORazepam [Ativan] Med  09/18/19 18:13 Ordered





 See Protocol  IVPUSH TITRATE PRN   


 


 LORazepam [Ativan] Med  09/18/19 18:13 Ordered





 See Protocol  PO TITRATE PRN   


 


 Losartan [Cozaar] Med  09/19/19 09:00 Ordered





 100 mg PO DAILY   


 


 Sodium Chloride 0.9% [Saline Flush] Med  09/18/19 18:13 Ordered





 10 ml FLUSH ASDIRECTED PRN   


 


 atorvaSTATin [Lipitor] Med  09/19/19 09:00 Ordered





 10 mg PO DAILY   


 


 hydroCHLOROthiazide Med  09/19/19 09:00 Ordered





 25 mg PO DAILY   


 


 metFORMIN HCl [Glucophage] Med  09/19/19 08:00 Ordered





 1,000 mg PO BIDMEALS   


 


 Blood Culture x2 Reflex Set [OM.PC] Stat Oth  09/18/19 14:00 Ordered


 


 Peripheral IV Insertion Adult [OM.PC] Routine Oth  09/18/19 18:13 Ordered


 


 Peripheral IV Insertion Adult [OM.PC] Stat Oth  09/18/19 13:59 Ordered


 


 Saline Lock Insert [OM.PC] Routine Oth  09/18/19 18:13 Ordered


 


 Resuscitation Status Routine Resus Stat  09/18/19 18:13 Ordered








 Medication Orders





Aspirin (Halfprin)  81 mg PO DAILY [facility]


Atorvastatin Calcium (Lipitor)  10 mg PO DAILY [facility]


Enoxaparin Sodium (Lovenox)  40 mg SUBCUT DAILY [facility]


Gabapentin (Neurontin)  300 mg PO BID [facility]


Glimepiride (Amaryl)  2 mg PO BIDMEALS [facility]


Hydrochlorothiazide (Hydrochlorothiazide)  25 mg PO DAILY [facility]


Insulin Human Lispro (Humalog)  0 unit SUBCUT QIDACANDBED [facility]; Protocol


Lorazepam (Ativan)  0 mg PO TITRATE PRN; Protocol


   PRN Reason: Withdrawal Symptoms


Lorazepam (Ativan)  0 mg IVPUSH TITRATE PRN; Protocol


   PRN Reason: Withdrawal Symptoms


Non-Formulary Medication (Duloxetine [Cymbalta])  60 mg PO DAILY SALMA


Non-Formulary Medication (Losartan [Cozaar])  100 mg PO DAILY SALMA


Non-Formulary Medication (Metformin Hcl [Glucophage])  1,000 mg PO BIDMEALS [facility]


Oxycodone/Acetaminophen (Percocet 325-5 Mg)  1 tab PO Q4H PRN


   PRN Reason: Pain (moderate 4-6)


Sodium Chloride (Saline Flush)  10 ml FLUSH ASDIRECTED PRN


   PRN Reason: Keep Vein Open








Assessment/Plan Comment:: 





#Confusion, possible dementia


CT shows atrophy and microvascular changes


Has hx of htn, dm, so might be vascular dementia


Has hx of alcoholism so dementia might be related to chronic alcoholism


also on several psychotropic meds like gabapentin, cymbalta. Will hold them for 

now


start oral thiamine


may need a neuropsych assessment to determine competence


might need placement as incapable of self care





#Falls


PT/OT for strengthening


might need walking device





#Chronic spine compression fractures


old, seen in prev imaging


start ca-vit d


ok to work with PT/OT without brace as fractures are old





#HTN


continue home meds





#DM2


stable


ISS, accuchecks


carb consistent diet


continue metformin and sulfonylurea





#DVT ppx


SC lovenox

## 2019-09-18 NOTE — EDM.PDOC
ED HPI GENERAL MEDICAL PROBLEM





- General


Chief Complaint: General


Stated Complaint: AMBULANCE


Time Seen by Provider: 09/18/19 13:45


Source of Information: Reports: Patient, EMS, EMS Notes Reviewed, RN, RN Notes 

Reviewed


History Limitations: Reports: No Limitations





- History of Present Illness


INITIAL COMMENTS - FREE TEXT/NARRATIVE: 


Patient presents to ER per [facility] with complaint of 

fall. States he got out of bed and fell down. Patient states he did not hit 

head or get knocked out. States he did fall a few days ago and hit head/face, 

states he was not knocked out that time either. States he was drinking alcohol 

when that fall happened. States that was the last time he used alcohol, which 

was 3 days ago. States he normally drinks daily. Patient denies hallucinations, 

sweats, nausea, tremors. Reports pain in head and low back. Denies numbness or 

tingling. GCS 14. Unsure of date, month, day, year. Oriented to person and 

place. Forgetful. 


Onset: Today, Sudden


  ** Left Anterior Head


Pain Score (Numeric/FACES): 5





- Related Data


 Allergies











Allergy/AdvReac Type Severity Reaction Status Date / Time


 


No Known Allergies Allergy   Verified 09/18/19 14:42











Home Meds: 


 Home Meds





DULoxetine [Cymbalta] 60 mg PO DAILY 06/16/18 [History]


Glimepiride [Amaryl] 2 mg PO BIDMEALS 06/16/18 [History]


Losartan [Cozaar] 100 mg PO DAILY 06/16/18 [History]


atorvaSTATin [Lipitor] 10 mg PO DAILY 06/16/18 [History]


metFORMIN HCl [Glucophage] 1,000 mg PO BIDMEALS 06/16/18 [History]


Aspirin [Halfprin] 81 mg PO DAILY 08/29/19 [History]


Gabapentin [Neurontin] 300 mg PO BID 08/29/19 [History]


Magnesium Oxide [Magnesium] 400 mg PO BID 08/29/19 [History]


Sodium Chloride 1 gm PO TID 08/29/19 [History]


Acetaminophen/oxyCODONE [Percocet 325-5 MG] 1 tab PO Q4H PRN 7 Days #20 tablet 

09/12/19 [Rx]


Meloxicam 7.5 mg PO BID 09/18/19 [History]


hydroCHLOROthiazide [Hydrochlorothiazide] 25 mg PO DAILY 09/18/19 [History]











Past Medical History


HEENT History: Reports: None


Cardiovascular History: Reports: High Cholesterol, Hypertension


Respiratory History: Reports: None


Gastrointestinal History: Reports: Other (See Below)


Other Gastrointestinal History: c-diff


Genitourinary History: Reports: None


Musculoskeletal History: Reports: Osteoarthritis


Other Musculoskeletal History: COMPRESSION FRACTURE t12, L1, L5, nerve pain


Neurological History: Reports: Other (See Below)


Other Neuro History: BILATERAL HAND PAIN, RESTLESS LEGS


Psychiatric History: Reports: Addiction, Depression


Endocrine/Metabolic History: Reports: Diabetes, Type II


Hematologic History: Reports: None


Immunologic History: Reports: None


Oncologic (Cancer) History: Reports: None


Dermatologic History: Reports: None





- Infectious Disease History


Infectious Disease History: Reports: C-Difficile





- Past Surgical History


Head Surgeries/Procedures: Reports: None





Social & Family History





- Family History


Family Medical History: Noncontributory





- Tobacco Use


Smoking Status *Q: Never Smoker





- Caffeine Use


Caffeine Use: Reports: Coffee, Soda, Tea





- Recreational Drug Use


Recreational Drug Use: Yes


Drug Use in Last 12 Months: Yes


Recreational Drug Type: Reports: Marijuana/Hashish





ED ROS GENERAL





- Review of Systems


Review Of Systems: ROS reveals no pertinent complaints other than HPI.





ED EXAM, GENERAL





- Physical Exam


Exam: See Below


Exam Limited By: No Limitations


General Appearance: Alert, WD/WN, Mild Distress, Other (vague, forgetfull)


Eye Exam: Bilateral Eye: EOMI, Normal Inspection, PERRL (3 brisk)


Ears: Normal External Exam, Normal Canal, Hearing Grossly Normal, Normal TMs


Nose: Normal Inspection, Normal Mucosa, No Blood


Throat/Mouth: Normal Inspection, Normal Lips, Normal Teeth, Normal Gums, Normal 

Oropharynx, Normal Voice, No Airway Compromise


Head: Normocephalic, Facial Swelling, Facial Tenderness


Neck: Normal Inspection, Supple, Non-Tender, Full Range of Motion


Respiratory/Chest: No Respiratory Distress, No Accessory Muscle Use, Chest Non-

Tender, Decreased Breath Sounds


Cardiovascular: Normal Peripheral Pulses, Regular Rate, Rhythm, No Edema, No 

Gallop, No JVD, No Murmur, No Rub


Peripheral Pulses: 2+: Radial (L), Radial (R), Dorsalis Pedis (L), Dorsalis 

Pedis (R)


GI/Abdominal: Normal Bowel Sounds, Soft, Non-Tender, No Organomegaly, No 

Distention, No Abnormal Bruit, No Mass


 (Male) Exam: Deferred


Rectal (Males) Exam: Deferred


Back Exam: Normal Inspection, Decreased Range of Motion, Vertebral Tenderness (

lumbar region)


Extremities: Normal Inspection, Non-Tender, No Pedal Edema, Normal Capillary 

Refill, Limited Range of Motion


Neurological: Alert, CN II-XII Intact, Normal Cognition, Normal Gait, Normal 

Reflexes, No Motor/Sensory Deficits, Disoriented (unsure of day, month, year)


Psychiatric: Normal Affect, Normal Mood


Skin Exam: Wound/Incision (Scabbed sores and abrasion to the nose, above left 

eye from previous fall.)


Lymphatic: No Adenopathy





Course





- Vital Signs


Last Recorded V/S: 


 Last Vital Signs











Temp  98.3 F   09/19/19 07:19


 


Pulse  78   09/19/19 07:19


 


Resp  18   09/19/19 07:19


 


BP  147/80 H  09/19/19 08:15


 


Pulse Ox  98   09/19/19 07:19














- Orders/Labs/Meds


Orders: 


 Active Orders 24 hr











 Category Date Time Status


 


 Peripheral IV Care [RC] .AS DIRECTED Care  09/18/19 14:00 Active


 


 CULTURE BLOOD [BC] Stat Lab  09/18/19 14:25 Received


 


 CULTURE BLOOD [BC] Stat Lab  09/18/19 14:34 Received


 


 Blood Culture x2 Reflex Set [OM.PC] Stat Oth  09/18/19 14:00 Ordered


 


 Peripheral IV Insertion Adult [OM.PC] Stat Oth  09/18/19 13:59 Ordered








 Medication Orders





Aspirin (Halfprin)  81 mg PO DAILY [facility]


   Last Admin: 09/19/19 08:16  Dose: 81 mg


Atorvastatin Calcium (Lipitor)  10 mg PO DAILY [facility]


   Last Admin: 09/19/19 08:16  Dose: 10 mg


Calcium Carbonate (Calcium Carbonate/Vitamin D 1250 Mg-200 Unit)  1 tab PO 

BIDMEALS [facility]


   Last Admin: 09/19/19 08:14  Dose: 1 tab


Enoxaparin Sodium (Lovenox)  40 mg SUBCUT DAILY@2100 [facility]


   Last Admin: 09/18/19 21:28  Dose: 40 mg


Glimepiride (Amaryl)  2 mg PO BIDMEALS [facility]


   Last Admin: 09/19/19 08:17  Dose: 2 mg


Hydrochlorothiazide (Hydrochlorothiazide)  25 mg PO DAILY [facility]


   Last Admin: 09/19/19 08:14  Dose: 25 mg


Magnesium Sulfate/Dextrose 1 (gm/ Premix)  100 mls @ 100 mls/hr IV ONETIME ONE


   Stop: 09/19/19 10:59


Insulin Human Lispro (Humalog)  0 unit SUBCUT QIDACANDBED [facility]; Protocol


   Last Admin: 09/19/19 07:46 Dose:  Not Given


   Admin: 09/18/19 21:29  Dose: 4 units


Lorazepam (Ativan)  0 mg PO TITRATE PRN; Protocol


   PRN Reason: Withdrawal Symptoms


Lorazepam (Ativan)  0 mg IVPUSH TITRATE PRN; Protocol


   PRN Reason: Withdrawal Symptoms


Losartan Potassium (Cozaar)  100 mg PO DAILY [facility]


   Last Admin: 09/19/19 08:15  Dose: 100 mg


Metformin HCl (Glucophage)  1,000 mg PO BIDMEALS [facility]


   Last Admin: 09/19/19 08:17  Dose: 1,000 mg


Oxycodone/Acetaminophen (Percocet 325-5 Mg)  1 tab PO Q4H PRN


   PRN Reason: Pain (moderate 4-6)


   Last Admin: 09/19/19 09:01  Dose: 1 tab


   Admin: 09/18/19 23:47  Dose: 1 tab


Sodium Chloride (Saline Flush)  10 ml FLUSH ASDIRECTED PRN


   PRN Reason: Keep Vein Open


Thiamine HCl (Vitamin B-1)  100 mg PO DAILY [facility]


   Last Admin: 09/19/19 08:14  Dose: 100 mg


   Admin: 09/18/19 21:28  Dose: 100 mg








Labs: 


 Laboratory Tests











  09/18/19 09/18/19 09/18/19 Range/Units





  13:33 14:31 14:34 


 


WBC   7.1   (5.0-10.0)  10^3/uL


 


RBC   3.83 L   (4.6-6.2)  10^6/uL


 


Hgb   12.3 L   (14.0-18.0)  g/dL


 


Hct   36.3 L   (40.0-54.0)  %


 


MCV   94.8   ()  fL


 


MCH   32.1   (27.0-34.0)  pg


 


MCHC   33.9   (33.0-35.0)  g/dL


 


Plt Count   518 H   (150-450)  10^3/uL


 


Neut % (Auto)   56.3   (42.2-75.2)  %


 


Lymph % (Auto)   33.9   (20.5-50.1)  %


 


Mono % (Auto)   7.8   (2-8)  %


 


Eos % (Auto)   1.3   (1.0-3.0)  %


 


Baso % (Auto)   0.7   (0.0-1.0)  %


 


Add Manual Diff   Yes   


 


Neutrophils % (Manual)   54   (42-75)  %


 


Lymphocytes % (Manual)   38   (20-50)  %


 


Monocytes % (Manual)   3   (2-8)  %


 


Eosinophils % (Manual)   3   (1-3)  %


 


Basophils % (Manual)   2   


 


Sodium    137  (135-145)  mmol/L


 


Potassium    3.9  (3.6-5.0)  mmol/L


 


Chloride    97 L  (101-111)  mmol/L


 


Carbon Dioxide    25.0  (21.0-31.0)  mmol/L


 


Anion Gap    18.9  


 


BUN    11  (7-18)  mg/dL


 


Creatinine    0.9  (0.6-1.3)  mg/dL


 


Est Cr Clr Drug Dosing    81.50  mL/min


 


Estimated GFR (MDRD)    > 60  


 


BUN/Creatinine Ratio    12.22  


 


Glucose    133 H  ()  mg/dL


 


POC Glucose  126 H    ()  mg/dl


 


Lactic Acid     (0.5-2.2)  mmol/L


 


Calcium    9.5  (8.4-10.2)  mg/dl


 


Total Bilirubin    1.1 H  (0.2-1.0)  mg/dL


 


AST    36  (10-42)  IU/L


 


ALT    35  (10-60)  IU/L


 


Alkaline Phosphatase    148 H  ()  IU/L


 


Troponin I    < 0.02  (0.00-0.02)  ng/ml


 


Total Protein    8.3 H  (6.7-8.2)  g/dl


 


Albumin    4.3  (3.2-5.5)  g/dl


 


Globulin    4.0  


 


Albumin/Globulin Ratio    1.08  


 


Urine Color     (YELLOW)  


 


Urine Appearance     (CLEAR)  


 


Urine pH     (5.0-9.0)  


 


Ur Specific Gravity     (1.005-1.030)  


 


Urine Protein     (NEGATIVE)  


 


Urine Glucose (UA)     (NEGATIVE)  


 


Urine Ketones     (NEGATIVE)  


 


Urine Occult Blood     (NEGATIVE)  


 


Urine Nitrite     (NEGATIVE)  


 


Urine Bilirubin     (NEGATIVE)  


 


Urine Urobilinogen     (0.2-1.0)  mg/dL


 


Ur Leukocyte Esterase     (NEGATIVE)  


 


Urine RBC     /HPF


 


Urine WBC     (0-5/HPF)  /HPF


 


Ur Epithelial Cells     (NOT SEEN)  /HPF


 


Urine Bacteria     (0-FEW/HPF)  /HPF


 


Urine Opiates Screen     (NEGATIVE)  


 


Ur Oxycodone Screen     (NEGATIVE)  


 


Urine Methadone Screen     (NEGATIVE)  


 


Ur Barbiturates Screen     (NEGATIVE)  


 


U Tricyclic Antidepress     (NEGATIVE)  


 


Ur Phencyclidine Scrn     (NEGATIVE)  


 


Ur Amphetamine Screen     (NEGATIVE)  


 


U Methamphetamines Scrn     (NEGATIVE)  


 


Urine MDMA Screen     (NEGATIVE)  


 


U Benzodiazepines Scrn     (NEGATIVE)  


 


Urine Cocaine Screen     (NEGATIVE)  


 


U Marijuana (THC) Screen     (NEGATIVE)  


 


Ethyl Alcohol    50  mg/dL














  09/18/19 09/18/19 09/18/19 Range/Units





  14:34 14:50 14:50 


 


WBC     (5.0-10.0)  10^3/uL


 


RBC     (4.6-6.2)  10^6/uL


 


Hgb     (14.0-18.0)  g/dL


 


Hct     (40.0-54.0)  %


 


MCV     ()  fL


 


MCH     (27.0-34.0)  pg


 


MCHC     (33.0-35.0)  g/dL


 


Plt Count     (150-450)  10^3/uL


 


Neut % (Auto)     (42.2-75.2)  %


 


Lymph % (Auto)     (20.5-50.1)  %


 


Mono % (Auto)     (2-8)  %


 


Eos % (Auto)     (1.0-3.0)  %


 


Baso % (Auto)     (0.0-1.0)  %


 


Add Manual Diff     


 


Neutrophils % (Manual)     (42-75)  %


 


Lymphocytes % (Manual)     (20-50)  %


 


Monocytes % (Manual)     (2-8)  %


 


Eosinophils % (Manual)     (1-3)  %


 


Basophils % (Manual)     


 


Sodium     (135-145)  mmol/L


 


Potassium     (3.6-5.0)  mmol/L


 


Chloride     (101-111)  mmol/L


 


Carbon Dioxide     (21.0-31.0)  mmol/L


 


Anion Gap     


 


BUN     (7-18)  mg/dL


 


Creatinine     (0.6-1.3)  mg/dL


 


Est Cr Clr Drug Dosing     mL/min


 


Estimated GFR (MDRD)     


 


BUN/Creatinine Ratio     


 


Glucose     ()  mg/dL


 


POC Glucose     ()  mg/dl


 


Lactic Acid  2.7 H    (0.5-2.2)  mmol/L


 


Calcium     (8.4-10.2)  mg/dl


 


Total Bilirubin     (0.2-1.0)  mg/dL


 


AST     (10-42)  IU/L


 


ALT     (10-60)  IU/L


 


Alkaline Phosphatase     ()  IU/L


 


Troponin I     (0.00-0.02)  ng/ml


 


Total Protein     (6.7-8.2)  g/dl


 


Albumin     (3.2-5.5)  g/dl


 


Globulin     


 


Albumin/Globulin Ratio     


 


Urine Color   Yellow   (YELLOW)  


 


Urine Appearance   Clear   (CLEAR)  


 


Urine pH   5.5   (5.0-9.0)  


 


Ur Specific Gravity   1.020   (1.005-1.030)  


 


Urine Protein   Negative   (NEGATIVE)  


 


Urine Glucose (UA)   100 H   (NEGATIVE)  


 


Urine Ketones   Negative   (NEGATIVE)  


 


Urine Occult Blood   Trace-intact H   (NEGATIVE)  


 


Urine Nitrite   Negative   (NEGATIVE)  


 


Urine Bilirubin   Negative   (NEGATIVE)  


 


Urine Urobilinogen   1.0   (0.2-1.0)  mg/dL


 


Ur Leukocyte Esterase   Negative   (NEGATIVE)  


 


Urine RBC   0-5   /HPF


 


Urine WBC   Not seen   (0-5/HPF)  /HPF


 


Ur Epithelial Cells   Not seen   (NOT SEEN)  /HPF


 


Urine Bacteria   Rare   (0-FEW/HPF)  /HPF


 


Urine Opiates Screen    Negative  (NEGATIVE)  


 


Ur Oxycodone Screen    Negative  (NEGATIVE)  


 


Urine Methadone Screen    Negative  (NEGATIVE)  


 


Ur Barbiturates Screen    Negative  (NEGATIVE)  


 


U Tricyclic Antidepress    Negative  (NEGATIVE)  


 


Ur Phencyclidine Scrn    Negative  (NEGATIVE)  


 


Ur Amphetamine Screen    Negative  (NEGATIVE)  


 


U Methamphetamines Scrn    Negative  (NEGATIVE)  


 


Urine MDMA Screen    Negative  (NEGATIVE)  


 


U Benzodiazepines Scrn    Negative  (NEGATIVE)  


 


Urine Cocaine Screen    Negative  (NEGATIVE)  


 


U Marijuana (THC) Screen    Negative  (NEGATIVE)  


 


Ethyl Alcohol     mg/dL











Meds: 


Medications











Generic Name Dose Route Start Last Admin





  Trade Name Joshuaq  PRN Reason Stop Dose Admin


 


Aspirin  81 mg  09/19/19 09:00  09/19/19 08:16





  Halfprin  PO   81 mg





  DAILY SALMA   Administration





     





     





     





     


 


Atorvastatin Calcium  10 mg  09/19/19 09:00  09/19/19 08:16





  Lipitor  PO   10 mg





  DAILY SALMA   Administration





     





     





     





     


 


Calcium Carbonate  1 tab  09/19/19 08:00  09/19/19 08:14





  Calcium Carbonate/Vitamin D 1250 Mg-200 Unit  PO   1 tab





  BIDMEALS SALMA   Administration





     





     





     





     


 


Enoxaparin Sodium  40 mg  09/18/19 21:00  09/18/19 21:28





  Lovenox  SUBCUT   40 mg





  DAILY@2100 SALMA   Administration





     





     





     





     


 


Glimepiride  2 mg  09/19/19 08:00  09/19/19 08:17





  Amaryl  PO   2 mg





  BIDMEALS SALMA   Administration





     





     





     





     


 


Hydrochlorothiazide  25 mg  09/19/19 09:00  09/19/19 08:14





  Hydrochlorothiazide  PO   25 mg





  DAILY SALMA   Administration





     





     





     





     


 


Magnesium Sulfate/Dextrose 1  100 mls @ 100 mls/hr  09/19/19 10:00  





  gm/ Premix  IV  09/19/19 10:59  





  ONETIME ONE   





     





     





     





     


 


Insulin Human Lispro  0 unit  09/18/19 21:00  09/19/19 07:46





  Humalog  SUBCUT   Not Given





  QIDACANDBED SALMA   





     





     





  Protocol   





     


 


Lorazepam  0 mg  09/18/19 18:13  





  Ativan  PO   





  TITRATE PRN   





  Withdrawal Symptoms   





     





  Protocol   





     


 


Lorazepam  0 mg  09/18/19 18:13  





  Ativan  IVPUSH   





  TITRATE PRN   





  Withdrawal Symptoms   





     





  Protocol   





     


 


Losartan Potassium  100 mg  09/19/19 09:00  09/19/19 08:15





  Cozaar  PO   100 mg





  DAILY SALMA   Administration





     





     





     





     


 


Metformin HCl  1,000 mg  09/19/19 08:00  09/19/19 08:17





  Glucophage  PO   1,000 mg





  BIDMEALS SALMA   Administration





     





     





     





     


 


Oxycodone/Acetaminophen  1 tab  09/18/19 18:15  09/19/19 09:01





  Percocet 325-5 Mg  PO   1 tab





  Q4H PRN   Administration





  Pain (moderate 4-6)   





     





     





     


 


Sodium Chloride  10 ml  09/18/19 18:13  





  Saline Flush  FLUSH   





  ASDIRECTED PRN   





  Keep Vein Open   





     





     





     


 


Thiamine HCl  100 mg  09/18/19 18:45  09/19/19 08:14





  Vitamin B-1  PO   100 mg





  DAILY SALMA   Administration





     





     





     





     














Discontinued Medications














Generic Name Dose Route Start Last Admin





  Trade Name Freq  PRN Reason Stop Dose Admin


 


Duloxetine HCl  60 mg  09/19/19 09:00  





  Cymbalta  PO   





  DAILY SALMA   





     





     





     





     


 


Gabapentin  300 mg  09/18/19 21:00  





  Neurontin  PO   





  BID SALMA   





     





     





     





     


 


Sodium Chloride  10 ml  09/18/19 13:59  





  Saline Flush  FLUSH   





  ASDIRECTED PRN   





  Keep Vein Open   





     





     





     














- Radiology Interpretation


Free Text/Narrative:: 


Head CT wo contrast:





FINDINGS:


Brain: There is no acute intracranial hemorrhage. There is lucency in the 

cerebral white matter, likely


microvascular disease although non-specific. Gray white differentiation is 

intact. There are no extraaxial


fluid collections. No evidence of mass. There is no mass effect or midline 

shift.


Ventricles: The ventricles and sulci are enlarged, consistent with volume loss 

/ atrophy. No


hydrocephalus.


Bones/joints: No acute fracture. There may be old fracture deformities of 

partially visualized


zygomatic arches.


Sinuses: Unremarkable as visualized. No acute sinusitis.


Mastoid air cells: No significant mastoid effusion.


Soft tissues: Unremarkable as visualized.


Vasculature: There is vascular calcification.


IMPRESSION:


1. No evidence of acute intracranial abnormality. No evidence of acute 

infarction, hemorrhage, or


mass.


2. Atrophy and microvascular disease.





Lumbar Spine xray:


FINDINGS:


Vertebrae: Old anterior compression fractures are noted at L2 and L3 similar to 

prior study. An old


anterior compression fractures seen at T11. The disc spaces are maintained. The 

facet joints are


normal.


Vasculature: The vasculature demonstrates marked atherosclerotic calcification.


Soft tissues: Unremarkable


IMPRESSION:


1. Old compression fractures in the thoracic and lumbar spine.


2. No acute abnormality.


Thank you for allowing us to participate in the care of your patient.


Dictated and Authenticated by: Wilson, Duane, MD


09/18/2019 4:54 PM Central Time ([place] & [place])








Cspine CT wo contrast:


FINDINGS:


Vertebrae: There is no evidence of acute fracture. The vertebral body heights 

are maintained. The


left upper cervical facet joints demonstrate moderate degenerative hypertrophy 

and sclerosis.


Discs/Spinal canal/Neural foramina: The disc spaces are maintained. There is 

mild narrowing of


the left C4-5 neural foramen due to facet hypertrophy. The other foramina are 

maintained.


Soft tissues: The extraspinous soft tissues are normal.


Lungs: The visualized portions of the lung apices are normal.


Vasculature: There is moderate atherosclerotic calcification of the left 

carotid bifurcation.


IMPRESSION:


1. Degenerative facet disease with mild foraminal narrowing.


2. No acute abnormality.


Thank you for allowing us to participate in the care of your patient.


Dictated and Authenticated by: Wilson, Duane, MD


09/18/2019 5:04 PM Central Time ([place] & [place])





See rad report








- Re-Assessments/Exams


Free Text/Narrative Re-Assessment/Exam: 


Discussed patient case with Dr. Upton who agreed to accept the patient for 

observation admission.








Departure





- Departure


Time of Disposition: 17:35


Disposition: Refer to Observation


Condition: Fair


Clinical Impression: 


 Frequent falls, Alcohol abuse, Confusion








- Discharge Information


*PRESCRIPTION DRUG MONITORING PROGRAM REVIEWED*: No


*COPY OF PRESCRIPTION DRUG MONITORING REPORT IN PATIENT JESÚS: No





- My Orders


Last 24 Hours: 


My Active Orders





09/18/19 13:59


Peripheral IV Insertion Adult [OM.PC] Stat 





09/18/19 14:00


Peripheral IV Care [RC] .AS DIRECTED 


Blood Culture x2 Reflex Set [OM.PC] Stat 





09/18/19 14:25


CULTURE BLOOD [BC] Stat 





09/18/19 14:34


CULTURE BLOOD [BC] Stat 














- Assessment/Plan


Last 24 Hours: 


My Active Orders





09/18/19 13:59


Peripheral IV Insertion Adult [OM.PC] Stat 





09/18/19 14:00


Peripheral IV Care [RC] .AS DIRECTED 


Blood Culture x2 Reflex Set [OM.PC] Stat 





09/18/19 14:25


CULTURE BLOOD [BC] Stat 





09/18/19 14:34


CULTURE BLOOD [BC] Stat

## 2019-09-19 LAB
ANION GAP SERPL CALC-SCNC: 15 MMOL/L
CHLORIDE SERPL-SCNC: 99 MMOL/L (ref 101–111)
SODIUM SERPL-SCNC: 138 MMOL/L (ref 135–145)

## 2019-09-19 RX ADMIN — Medication SCH TAB: at 17:48

## 2019-09-19 RX ADMIN — OXYCODONE HYDROCHLORIDE AND ACETAMINOPHEN PRN TAB: 5; 325 TABLET ORAL at 19:08

## 2019-09-19 RX ADMIN — Medication PRN ML: at 11:45

## 2019-09-19 RX ADMIN — Medication SCH TAB: at 08:14

## 2019-09-19 RX ADMIN — OXYCODONE HYDROCHLORIDE AND ACETAMINOPHEN PRN TAB: 5; 325 TABLET ORAL at 09:01

## 2019-09-19 NOTE — PCM.PN
- General Info


Date of Service: 09/19/19


Admission Dx/Problem (Free Text): 


 Admission Diagnosis/Problem





Admission Diagnosis/Problem      Falls








Subjective Update: 





Patient seen and examined


No new complaints this morning. 


Functional Status: Reports: Pain Controlled





- Review of Systems


General: Reports: No Symptoms


HEENT: Reports: No Symptoms


Pulmonary: Reports: No Symptoms


Cardiovascular: Reports: No Symptoms


Gastrointestinal: Reports: No Symptoms


Genitourinary: Reports: No Symptoms


Musculoskeletal: Reports: No Symptoms


Skin: Reports: No Symptoms


Neurological: Reports: No Symptoms





- Patient Data


Vitals - Most Recent: 


 Last Vital Signs











Temp  36.8 C   09/19/19 07:19


 


Pulse  78   09/19/19 07:19


 


Resp  18   09/19/19 07:19


 


BP  147/80 H  09/19/19 08:15


 


Pulse Ox  98   09/19/19 07:19











Weight - Most Recent: 66.678 kg


I&O - Last 24 Hours: 


 Intake & Output











 09/18/19 09/19/19 09/19/19





 22:59 06:59 14:59


 


Intake Total 760  860


 


Output Total  150 


 


Balance 760 -150 860











Lab Results Last 24 Hours: 


 Laboratory Results - last 24 hr











  09/18/19 09/18/19 09/18/19 Range/Units





  13:33 14:31 14:34 


 


WBC   7.1   (5.0-10.0)  10^3/uL


 


RBC   3.83 L   (4.6-6.2)  10^6/uL


 


Hgb   12.3 L   (14.0-18.0)  g/dL


 


Hct   36.3 L   (40.0-54.0)  %


 


MCV   94.8   ()  fL


 


MCH   32.1   (27.0-34.0)  pg


 


MCHC   33.9   (33.0-35.0)  g/dL


 


Plt Count   518 H   (150-450)  10^3/uL


 


Neut % (Auto)   56.3   (42.2-75.2)  %


 


Lymph % (Auto)   33.9   (20.5-50.1)  %


 


Mono % (Auto)   7.8   (2-8)  %


 


Eos % (Auto)   1.3   (1.0-3.0)  %


 


Baso % (Auto)   0.7   (0.0-1.0)  %


 


Add Manual Diff   Yes   


 


Neutrophils % (Manual)   54   (42-75)  %


 


Lymphocytes % (Manual)   38   (20-50)  %


 


Monocytes % (Manual)   3   (2-8)  %


 


Eosinophils % (Manual)   3   (1-3)  %


 


Basophils % (Manual)   2   


 


Sodium    137  (135-145)  mmol/L


 


Potassium    3.9  (3.6-5.0)  mmol/L


 


Chloride    97 L  (101-111)  mmol/L


 


Carbon Dioxide    25.0  (21.0-31.0)  mmol/L


 


Anion Gap    18.9  


 


BUN    11  (7-18)  mg/dL


 


Creatinine    0.9  (0.6-1.3)  mg/dL


 


Est Cr Clr Drug Dosing    81.50  mL/min


 


Estimated GFR (MDRD)    > 60  


 


BUN/Creatinine Ratio    12.22  


 


Glucose    133 H  ()  mg/dL


 


POC Glucose  126 H    ()  mg/dl


 


Lactic Acid     (0.5-2.2)  mmol/L


 


Calcium    9.5  (8.4-10.2)  mg/dl


 


Phosphorus     (2.5-4.6)  mg/dL


 


Magnesium     (1.8-2.5)  mg/dL


 


Total Bilirubin    1.1 H  (0.2-1.0)  mg/dL


 


AST    36  (10-42)  IU/L


 


ALT    35  (10-60)  IU/L


 


Alkaline Phosphatase    148 H  ()  IU/L


 


Troponin I    < 0.02  (0.00-0.02)  ng/ml


 


Total Protein    8.3 H  (6.7-8.2)  g/dl


 


Albumin    4.3  (3.2-5.5)  g/dl


 


Globulin    4.0  


 


Albumin/Globulin Ratio    1.08  


 


Urine Color     (YELLOW)  


 


Urine Appearance     (CLEAR)  


 


Urine pH     (5.0-9.0)  


 


Ur Specific Gravity     (1.005-1.030)  


 


Urine Protein     (NEGATIVE)  


 


Urine Glucose (UA)     (NEGATIVE)  


 


Urine Ketones     (NEGATIVE)  


 


Urine Occult Blood     (NEGATIVE)  


 


Urine Nitrite     (NEGATIVE)  


 


Urine Bilirubin     (NEGATIVE)  


 


Urine Urobilinogen     (0.2-1.0)  mg/dL


 


Ur Leukocyte Esterase     (NEGATIVE)  


 


Urine RBC     /HPF


 


Urine WBC     (0-5/HPF)  /HPF


 


Ur Epithelial Cells     (NOT SEEN)  /HPF


 


Urine Bacteria     (0-FEW/HPF)  /HPF


 


Urine Opiates Screen     (NEGATIVE)  


 


Ur Oxycodone Screen     (NEGATIVE)  


 


Urine Methadone Screen     (NEGATIVE)  


 


Ur Barbiturates Screen     (NEGATIVE)  


 


U Tricyclic Antidepress     (NEGATIVE)  


 


Ur Phencyclidine Scrn     (NEGATIVE)  


 


Ur Amphetamine Screen     (NEGATIVE)  


 


U Methamphetamines Scrn     (NEGATIVE)  


 


Urine MDMA Screen     (NEGATIVE)  


 


U Benzodiazepines Scrn     (NEGATIVE)  


 


Urine Cocaine Screen     (NEGATIVE)  


 


U Marijuana (THC) Screen     (NEGATIVE)  


 


Ethyl Alcohol    50  mg/dL














  09/18/19 09/18/19 09/18/19 Range/Units





  14:34 14:50 14:50 


 


WBC     (5.0-10.0)  10^3/uL


 


RBC     (4.6-6.2)  10^6/uL


 


Hgb     (14.0-18.0)  g/dL


 


Hct     (40.0-54.0)  %


 


MCV     ()  fL


 


MCH     (27.0-34.0)  pg


 


MCHC     (33.0-35.0)  g/dL


 


Plt Count     (150-450)  10^3/uL


 


Neut % (Auto)     (42.2-75.2)  %


 


Lymph % (Auto)     (20.5-50.1)  %


 


Mono % (Auto)     (2-8)  %


 


Eos % (Auto)     (1.0-3.0)  %


 


Baso % (Auto)     (0.0-1.0)  %


 


Add Manual Diff     


 


Neutrophils % (Manual)     (42-75)  %


 


Lymphocytes % (Manual)     (20-50)  %


 


Monocytes % (Manual)     (2-8)  %


 


Eosinophils % (Manual)     (1-3)  %


 


Basophils % (Manual)     


 


Sodium     (135-145)  mmol/L


 


Potassium     (3.6-5.0)  mmol/L


 


Chloride     (101-111)  mmol/L


 


Carbon Dioxide     (21.0-31.0)  mmol/L


 


Anion Gap     


 


BUN     (7-18)  mg/dL


 


Creatinine     (0.6-1.3)  mg/dL


 


Est Cr Clr Drug Dosing     mL/min


 


Estimated GFR (MDRD)     


 


BUN/Creatinine Ratio     


 


Glucose     ()  mg/dL


 


POC Glucose     ()  mg/dl


 


Lactic Acid  2.7 H    (0.5-2.2)  mmol/L


 


Calcium     (8.4-10.2)  mg/dl


 


Phosphorus     (2.5-4.6)  mg/dL


 


Magnesium     (1.8-2.5)  mg/dL


 


Total Bilirubin     (0.2-1.0)  mg/dL


 


AST     (10-42)  IU/L


 


ALT     (10-60)  IU/L


 


Alkaline Phosphatase     ()  IU/L


 


Troponin I     (0.00-0.02)  ng/ml


 


Total Protein     (6.7-8.2)  g/dl


 


Albumin     (3.2-5.5)  g/dl


 


Globulin     


 


Albumin/Globulin Ratio     


 


Urine Color   Yellow   (YELLOW)  


 


Urine Appearance   Clear   (CLEAR)  


 


Urine pH   5.5   (5.0-9.0)  


 


Ur Specific Gravity   1.020   (1.005-1.030)  


 


Urine Protein   Negative   (NEGATIVE)  


 


Urine Glucose (UA)   100 H   (NEGATIVE)  


 


Urine Ketones   Negative   (NEGATIVE)  


 


Urine Occult Blood   Trace-intact H   (NEGATIVE)  


 


Urine Nitrite   Negative   (NEGATIVE)  


 


Urine Bilirubin   Negative   (NEGATIVE)  


 


Urine Urobilinogen   1.0   (0.2-1.0)  mg/dL


 


Ur Leukocyte Esterase   Negative   (NEGATIVE)  


 


Urine RBC   0-5   /HPF


 


Urine WBC   Not seen   (0-5/HPF)  /HPF


 


Ur Epithelial Cells   Not seen   (NOT SEEN)  /HPF


 


Urine Bacteria   Rare   (0-FEW/HPF)  /HPF


 


Urine Opiates Screen    Negative  (NEGATIVE)  


 


Ur Oxycodone Screen    Negative  (NEGATIVE)  


 


Urine Methadone Screen    Negative  (NEGATIVE)  


 


Ur Barbiturates Screen    Negative  (NEGATIVE)  


 


U Tricyclic Antidepress    Negative  (NEGATIVE)  


 


Ur Phencyclidine Scrn    Negative  (NEGATIVE)  


 


Ur Amphetamine Screen    Negative  (NEGATIVE)  


 


U Methamphetamines Scrn    Negative  (NEGATIVE)  


 


Urine MDMA Screen    Negative  (NEGATIVE)  


 


U Benzodiazepines Scrn    Negative  (NEGATIVE)  


 


Urine Cocaine Screen    Negative  (NEGATIVE)  


 


U Marijuana (THC) Screen    Negative  (NEGATIVE)  


 


Ethyl Alcohol     mg/dL














  09/18/19 09/19/19 09/19/19 Range/Units





  20:50 06:19 06:19 


 


WBC   6.8   (5.0-10.0)  10^3/uL


 


RBC   3.72 L   (4.6-6.2)  10^6/uL


 


Hgb   12.0 L   (14.0-18.0)  g/dL


 


Hct   35.3 L   (40.0-54.0)  %


 


MCV   94.9   ()  fL


 


MCH   32.3   (27.0-34.0)  pg


 


MCHC   34.0   (33.0-35.0)  g/dL


 


Plt Count   469 H   (150-450)  10^3/uL


 


Neut % (Auto)     (42.2-75.2)  %


 


Lymph % (Auto)     (20.5-50.1)  %


 


Mono % (Auto)     (2-8)  %


 


Eos % (Auto)     (1.0-3.0)  %


 


Baso % (Auto)     (0.0-1.0)  %


 


Add Manual Diff     


 


Neutrophils % (Manual)     (42-75)  %


 


Lymphocytes % (Manual)     (20-50)  %


 


Monocytes % (Manual)     (2-8)  %


 


Eosinophils % (Manual)     (1-3)  %


 


Basophils % (Manual)     


 


Sodium    138  (135-145)  mmol/L


 


Potassium    4.0  (3.6-5.0)  mmol/L


 


Chloride    99 L  (101-111)  mmol/L


 


Carbon Dioxide    28.0  (21.0-31.0)  mmol/L


 


Anion Gap    15.0  


 


BUN    15  (7-18)  mg/dL


 


Creatinine    0.9  (0.6-1.3)  mg/dL


 


Est Cr Clr Drug Dosing    78.54  mL/min


 


Estimated GFR (MDRD)    > 60  


 


BUN/Creatinine Ratio     


 


Glucose    128 H  ()  mg/dL


 


POC Glucose  225 H    ()  mg/dl


 


Lactic Acid     (0.5-2.2)  mmol/L


 


Calcium    9.3  (8.4-10.2)  mg/dl


 


Phosphorus    4.0  (2.5-4.6)  mg/dL


 


Magnesium    1.4 L  (1.8-2.5)  mg/dL


 


Total Bilirubin     (0.2-1.0)  mg/dL


 


AST     (10-42)  IU/L


 


ALT     (10-60)  IU/L


 


Alkaline Phosphatase     ()  IU/L


 


Troponin I     (0.00-0.02)  ng/ml


 


Total Protein     (6.7-8.2)  g/dl


 


Albumin     (3.2-5.5)  g/dl


 


Globulin     


 


Albumin/Globulin Ratio     


 


Urine Color     (YELLOW)  


 


Urine Appearance     (CLEAR)  


 


Urine pH     (5.0-9.0)  


 


Ur Specific Gravity     (1.005-1.030)  


 


Urine Protein     (NEGATIVE)  


 


Urine Glucose (UA)     (NEGATIVE)  


 


Urine Ketones     (NEGATIVE)  


 


Urine Occult Blood     (NEGATIVE)  


 


Urine Nitrite     (NEGATIVE)  


 


Urine Bilirubin     (NEGATIVE)  


 


Urine Urobilinogen     (0.2-1.0)  mg/dL


 


Ur Leukocyte Esterase     (NEGATIVE)  


 


Urine RBC     /HPF


 


Urine WBC     (0-5/HPF)  /HPF


 


Ur Epithelial Cells     (NOT SEEN)  /HPF


 


Urine Bacteria     (0-FEW/HPF)  /HPF


 


Urine Opiates Screen     (NEGATIVE)  


 


Ur Oxycodone Screen     (NEGATIVE)  


 


Urine Methadone Screen     (NEGATIVE)  


 


Ur Barbiturates Screen     (NEGATIVE)  


 


U Tricyclic Antidepress     (NEGATIVE)  


 


Ur Phencyclidine Scrn     (NEGATIVE)  


 


Ur Amphetamine Screen     (NEGATIVE)  


 


U Methamphetamines Scrn     (NEGATIVE)  


 


Urine MDMA Screen     (NEGATIVE)  


 


U Benzodiazepines Scrn     (NEGATIVE)  


 


Urine Cocaine Screen     (NEGATIVE)  


 


U Marijuana (THC) Screen     (NEGATIVE)  


 


Ethyl Alcohol     mg/dL














  09/19/19 09/19/19 Range/Units





  06:19 07:35 


 


WBC    (5.0-10.0)  10^3/uL


 


RBC    (4.6-6.2)  10^6/uL


 


Hgb    (14.0-18.0)  g/dL


 


Hct    (40.0-54.0)  %


 


MCV    ()  fL


 


MCH    (27.0-34.0)  pg


 


MCHC    (33.0-35.0)  g/dL


 


Plt Count    (150-450)  10^3/uL


 


Neut % (Auto)    (42.2-75.2)  %


 


Lymph % (Auto)    (20.5-50.1)  %


 


Mono % (Auto)    (2-8)  %


 


Eos % (Auto)    (1.0-3.0)  %


 


Baso % (Auto)    (0.0-1.0)  %


 


Add Manual Diff    


 


Neutrophils % (Manual)    (42-75)  %


 


Lymphocytes % (Manual)    (20-50)  %


 


Monocytes % (Manual)    (2-8)  %


 


Eosinophils % (Manual)    (1-3)  %


 


Basophils % (Manual)    


 


Sodium    (135-145)  mmol/L


 


Potassium    (3.6-5.0)  mmol/L


 


Chloride    (101-111)  mmol/L


 


Carbon Dioxide    (21.0-31.0)  mmol/L


 


Anion Gap    


 


BUN    (7-18)  mg/dL


 


Creatinine    (0.6-1.3)  mg/dL


 


Est Cr Clr Drug Dosing    mL/min


 


Estimated GFR (MDRD)    


 


BUN/Creatinine Ratio    


 


Glucose    ()  mg/dL


 


POC Glucose   113 H  ()  mg/dl


 


Lactic Acid  1.0   (0.5-2.2)  mmol/L


 


Calcium    (8.4-10.2)  mg/dl


 


Phosphorus    (2.5-4.6)  mg/dL


 


Magnesium    (1.8-2.5)  mg/dL


 


Total Bilirubin    (0.2-1.0)  mg/dL


 


AST    (10-42)  IU/L


 


ALT    (10-60)  IU/L


 


Alkaline Phosphatase    ()  IU/L


 


Troponin I    (0.00-0.02)  ng/ml


 


Total Protein    (6.7-8.2)  g/dl


 


Albumin    (3.2-5.5)  g/dl


 


Globulin    


 


Albumin/Globulin Ratio    


 


Urine Color    (YELLOW)  


 


Urine Appearance    (CLEAR)  


 


Urine pH    (5.0-9.0)  


 


Ur Specific Gravity    (1.005-1.030)  


 


Urine Protein    (NEGATIVE)  


 


Urine Glucose (UA)    (NEGATIVE)  


 


Urine Ketones    (NEGATIVE)  


 


Urine Occult Blood    (NEGATIVE)  


 


Urine Nitrite    (NEGATIVE)  


 


Urine Bilirubin    (NEGATIVE)  


 


Urine Urobilinogen    (0.2-1.0)  mg/dL


 


Ur Leukocyte Esterase    (NEGATIVE)  


 


Urine RBC    /HPF


 


Urine WBC    (0-5/HPF)  /HPF


 


Ur Epithelial Cells    (NOT SEEN)  /HPF


 


Urine Bacteria    (0-FEW/HPF)  /HPF


 


Urine Opiates Screen    (NEGATIVE)  


 


Ur Oxycodone Screen    (NEGATIVE)  


 


Urine Methadone Screen    (NEGATIVE)  


 


Ur Barbiturates Screen    (NEGATIVE)  


 


U Tricyclic Antidepress    (NEGATIVE)  


 


Ur Phencyclidine Scrn    (NEGATIVE)  


 


Ur Amphetamine Screen    (NEGATIVE)  


 


U Methamphetamines Scrn    (NEGATIVE)  


 


Urine MDMA Screen    (NEGATIVE)  


 


U Benzodiazepines Scrn    (NEGATIVE)  


 


Urine Cocaine Screen    (NEGATIVE)  


 


U Marijuana (THC) Screen    (NEGATIVE)  


 


Ethyl Alcohol    mg/dL











Med Orders - Current: 


 Current Medications





Aspirin (Halfprin)  81 mg PO DAILY [facility]


   Last Admin: 09/19/19 08:16 Dose:  81 mg


Atorvastatin Calcium (Lipitor)  10 mg PO DAILY [facility]


   Last Admin: 09/19/19 08:16 Dose:  10 mg


Calcium Carbonate (Calcium Carbonate/Vitamin D 1250 Mg-200 Unit)  1 tab PO 

BIDMEALS [facility]


   Last Admin: 09/19/19 08:14 Dose:  1 tab


Enoxaparin Sodium (Lovenox)  40 mg SUBCUT DAILY@2100 [facility]


   Last Admin: 09/18/19 21:28 Dose:  40 mg


Glimepiride (Amaryl)  2 mg PO BIDMEALS [facility]


   Last Admin: 09/19/19 08:17 Dose:  2 mg


Hydrochlorothiazide (Hydrochlorothiazide)  25 mg PO DAILY [facility]


   Last Admin: 09/19/19 08:14 Dose:  25 mg


Magnesium Sulfate/Dextrose 1 (gm/ Premix)  100 mls @ 100 mls/hr IV ONETIME ONE


   Stop: 09/19/19 10:59


Insulin Human Lispro (Humalog)  0 unit SUBCUT QIDACANDBED [facility]; Protocol


   Last Admin: 09/19/19 07:46 Dose:  Not Given


Lorazepam (Ativan)  0 mg PO TITRATE PRN; Protocol


   PRN Reason: Withdrawal Symptoms


Lorazepam (Ativan)  0 mg IVPUSH TITRATE PRN; Protocol


   PRN Reason: Withdrawal Symptoms


Losartan Potassium (Cozaar)  100 mg PO DAILY [facility]


   Last Admin: 09/19/19 08:15 Dose:  100 mg


[facility]


   Last Admin: 09/19/19 08:17 Dose:  1,000 mg


Oxycodone/Acetaminophen (Percocet 325-5 Mg)  1 tab PO Q4H PRN


   PRN Reason: Pain (moderate 4-6)


   Last Admin: 09/19/19 09:01 Dose:  1 tab


Sodium Chloride (Saline Flush)  10 ml FLUSH ASDIRECTED PRN


   PRN Reason: Keep Vein Open


Thiamine HCl (Vitamin B-1)  100 mg PO DAILY [facility]


   Last Admin: 09/19/19 08:14 Dose:  100 mg





Discontinued Medications





Duloxetine HCl (Cymbalta)  60 mg PO DAILY [facility]


Gabapentin (Neurontin)  300 mg PO BID [facility]


Sodium Chloride (Saline Flush)  10 ml FLUSH ASDIRECTED PRN


   PRN Reason: Keep Vein Open











- Exam


General: Alert, Oriented


HEENT: Pupils Equal, Pupils Reactive


Neck: Supple


Lungs: Clear to Auscultation, Normal Respiratory Effort


Cardiovascular: Regular Rate, Regular Rhythm


GI/Abdominal Exam: Normal Bowel Sounds, Soft, Non-Tender, No Organomegaly


Extremities: Normal Inspection, Normal Range of Motion, Non-Tender, No Pedal 

Edema





- Problem List Review


Problem List Initiated/Reviewed/Updated: Yes





- My Orders


Last 24 Hours: 


My Active Orders





09/18/19 18:13


Ambulate [RC] ASDIRECTED 


Height and Weight [RC] 0600 


Oxygen Therapy [RC] .PRN 


Up With Assistance [RC] ASDIRECTED 


VTE/DVT Education [RC] PER UNIT ROUTINE 


Vital Signs [RC] Q4H 


OT Evaluation and Treatment [CONS] Routine 


PT Evaluation and Treatment [CONS] Routine 


LORazepam [Ativan]   See Protocol  IVPUSH TITRATE PRN 


LORazepam [Ativan]   See Protocol  PO TITRATE PRN 


Sodium Chloride 0.9% [Saline Flush]   10 ml FLUSH ASDIRECTED PRN 


Peripheral IV Insertion Adult [OM.PC] Routine 


Saline Lock Insert [OM.PC] Routine 


Resuscitation Status Routine 





09/18/19 18:15


Acetaminophen/oxyCODONE [Percocet 325-5 MG]   1 tab PO Q4H PRN 





09/18/19 18:16


Accu Check [Blood Glucose Check, Bedside] [RC] QIDACANDBED 





09/18/19 18:45


Thiamine [Vitamin B-1]   100 mg PO DAILY 





09/18/19 21:00


Enoxaparin [Lovenox]   40 mg SUBCUT DAILY@2100 


Insulin Lispro [HumaLOG]   See Protocol  SUBCUT QIDACANDBED 





09/19/19 08:00


Calcium Carbonate/Vitamin D3 [Calcium Carbonate/Vitamin D 1250 MG-200 Unit]   1 

tab PO BIDMEALS 


Glimepiride [Amaryl]   2 mg PO BIDMEALS 


metFORMIN [Glucophage]   1,000 mg PO BIDMEALS 





09/19/19 09:00


Aspirin [Halfprin]   81 mg PO DAILY 


Losartan [Cozaar]   100 mg PO DAILY 


atorvaSTATin [Lipitor]   10 mg PO DAILY 


hydroCHLOROthiazide   25 mg PO DAILY 





09/19/19 10:00


Magnesium Sulfate/D5W [Magnesium Sulfate in D5W 100 Premix] 1 gm   Premix Bag 1 

bag IV ONETIME 





09/19/19 Dinner


Consistent Carbohydrate Diet [DIET] 





09/20/19 05:11


BASIC METABOLIC PANEL,BMP [CHEM] AM 


CBC W/O DIFF,HEMOGRAM [HEME] AM 


MAGNESIUM [CHEM] AM 


PHOSPHORUS [CHEM] AM 





09/21/19 05:11


BASIC METABOLIC PANEL,BMP [CHEM] AM 


CBC W/O DIFF,HEMOGRAM [HEME] AM 


MAGNESIUM [CHEM] AM 


PHOSPHORUS [CHEM] AM 














- Plan


Plan:: 





#Confusion, possible dementia


CT shows atrophy and microvascular changes


Has hx of htn, dm, so might be vascular dementia


Has hx of alcoholism so dementia might be related to chronic alcoholism


also on several psychotropic meds like gabapentin, cymbalta. Will hold them for 

now


start oral thiamine


may need a neuropsych assessment to determine competence


might need placement as incapable of self care





#Falls


PT/OT for strengthening


might need walking device





#Chronic spine compression fractures


old, seen in prev imaging


start ca-vit d


ok to work with PT/OT without brace as fractures are old





#HTN


continue home meds





#DM2


stable


ISS, accuchecks


carb consistent diet


continue metformin and sulfonylurea





#DVT ppx


SC lovenox

## 2019-09-20 LAB
ANION GAP SERPL CALC-SCNC: 11.6 MMOL/L
CHLORIDE SERPL-SCNC: 97 MMOL/L (ref 101–111)
SODIUM SERPL-SCNC: 135 MMOL/L (ref 135–145)

## 2019-09-20 RX ADMIN — Medication PRN ML: at 08:30

## 2019-09-20 RX ADMIN — OXYCODONE HYDROCHLORIDE AND ACETAMINOPHEN PRN TAB: 5; 325 TABLET ORAL at 05:05

## 2019-09-20 RX ADMIN — OXYCODONE HYDROCHLORIDE AND ACETAMINOPHEN PRN TAB: 5; 325 TABLET ORAL at 12:34

## 2019-09-20 RX ADMIN — Medication SCH TAB: at 08:28

## 2019-09-24 ENCOUNTER — HOSPITAL ENCOUNTER (EMERGENCY)
Dept: HOSPITAL 43 - DL.ED | Age: 64
Discharge: NURSING, REHAB OR LONG-TERM CARE | End: 2019-09-24
Payer: MEDICAID

## 2019-09-24 DIAGNOSIS — R15.9: ICD-10-CM

## 2019-09-24 DIAGNOSIS — Z79.84: ICD-10-CM

## 2019-09-24 DIAGNOSIS — E78.00: ICD-10-CM

## 2019-09-24 DIAGNOSIS — Z79.82: ICD-10-CM

## 2019-09-24 DIAGNOSIS — S32.029A: Primary | ICD-10-CM

## 2019-09-24 DIAGNOSIS — E11.9: ICD-10-CM

## 2019-09-24 DIAGNOSIS — M19.90: ICD-10-CM

## 2019-09-24 DIAGNOSIS — F32.9: ICD-10-CM

## 2019-09-24 DIAGNOSIS — R41.82: ICD-10-CM

## 2019-09-24 DIAGNOSIS — R53.1: ICD-10-CM

## 2019-09-24 DIAGNOSIS — Z79.899: ICD-10-CM

## 2019-09-24 DIAGNOSIS — I10: ICD-10-CM

## 2019-09-24 LAB
ANION GAP SERPL CALC-SCNC: 13.8 MMOL/L
CHLORIDE SERPL-SCNC: 93 MMOL/L (ref 101–111)
SODIUM SERPL-SCNC: 130 MMOL/L (ref 135–145)

## 2019-09-24 PROCEDURE — G0480 DRUG TEST DEF 1-7 CLASSES: HCPCS
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
## 2019-10-14 ENCOUNTER — HOSPITAL ENCOUNTER (EMERGENCY)
Dept: HOSPITAL 52 - LL.ED | Age: 64
Discharge: NURSING, REHAB OR LONG-TERM CARE | End: 2019-10-14
Payer: MEDICAID

## 2019-10-14 DIAGNOSIS — E11.22: ICD-10-CM

## 2019-10-14 DIAGNOSIS — S32.000A: Primary | ICD-10-CM

## 2019-10-14 DIAGNOSIS — F32.9: ICD-10-CM

## 2019-10-14 DIAGNOSIS — Z79.84: ICD-10-CM

## 2019-10-14 DIAGNOSIS — Z79.899: ICD-10-CM

## 2019-10-14 DIAGNOSIS — I12.9: ICD-10-CM
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
## 2019-10-14 DIAGNOSIS — E78.00: ICD-10-CM
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
## 2019-10-14 DIAGNOSIS — N17.9: ICD-10-CM

## 2019-10-14 DIAGNOSIS — Z79.82: ICD-10-CM

## 2019-10-14 DIAGNOSIS — N18.9: ICD-10-CM

## 2019-10-14 LAB
CHLORIDE SERPL-SCNC: 103 MMOL/L (ref 98–107)
SODIUM SERPL-SCNC: 139 MMOL/L (ref 136–145)

## 2019-10-14 PROCEDURE — G0480 DRUG TEST DEF 1-7 CLASSES: HCPCS

## 2019-10-14 NOTE — EDM.PDOC
ED HPI GENERAL MEDICAL PROBLEM





- General


Chief Complaint: General


Stated Complaint: back pain, ? seizure activity


Time Seen by Provider: 10/14/19 00:10


Source of Information: Reports: Family, Old Records


History Limitations: Reports: Altered Mental Status





- History of Present Illness


INITIAL COMMENTS - FREE TEXT/NARRATIVE: 





Patient is a 64-year-old gentleman who was brought from his home by daughter 

states that he had 2 near syncopal episodes at home she states that she got him 

up and he rolled his eyes backwards and was falling backwards when they held 

and then that she did again she went ahead and gave him sugar since is a 

diabetic did not check his blood sugars but his blood sugars now is like 163 he 

also has history of hypertension diabetes and chronic back pain with lumbar 

compression fracture he was hospitalized about 2-1/2 weeks ago at [place] 

and discharged home with daughter waiting for placement in nursing home. At 

this time a CT of the head was obtained which I reviewed no acute intracranial 

bleeding was seen pending radiologist report. We'll go ahead and do a CT of the 

line lab work


Onset: Today


Duration: Hour(s):, Other (No acute changes patient and daughter denies any 

head injury.)


Severity: Moderate


Improves with: Reports: Rest


Worsens with: Reports: Movement


Associated Symptoms: Reports: Confusion





- Related Data


 Allergies











Allergy/AdvReac Type Severity Reaction Status Date / Time


 


No Known Allergies Allergy   Verified 10/14/19 00:20











Home Meds: 


 Home Meds





DULoxetine [Cymbalta] 60 mg PO DAILY 06/16/18 [History]


Glimepiride [Amaryl] 2 mg PO BIDMEALS 06/16/18 [History]


Losartan [Cozaar] 100 mg PO DAILY 06/16/18 [History]


atorvaSTATin [Lipitor] 10 mg PO DAILY 06/16/18 [History]


metFORMIN HCl [Glucophage] 1,000 mg PO BIDMEALS 06/16/18 [History]


Aspirin [Halfprin] 81 mg PO DAILY 08/29/19 [History]


Gabapentin [Neurontin] 300 mg PO BID 08/29/19 [History]


Magnesium Oxide [Magnesium] 400 mg PO BID 08/29/19 [History]


Sodium Chloride 1 gm PO TID 08/29/19 [History]


Calcium Carbonate/Vitamin D3 [Oyster Shell 500-Vit D3 200 Tb] 1 tab PO DAILY 10/

14/19 [History]


Cholecalciferol (Vitamin D3) [Vitamin D3] 1,000 unit PO DAILY 10/14/19 [History]











Past Medical History


HEENT History: Reports: None


Cardiovascular History: Reports: High Cholesterol, Hypertension


Respiratory History: Reports: None


Gastrointestinal History: Reports: Other (See Below)


Other Gastrointestinal History: c-diff


Genitourinary History: Reports: None


Musculoskeletal History: Reports: Osteoarthritis


Other Musculoskeletal History: COMPRESSION FRACTURE t12, L1, L5, nerve pain


Neurological History: Reports: Other (See Below)


Other Neuro History: BILATERAL HAND PAIN, RESTLESS LEGS


Psychiatric History: Reports: Addiction, Depression


Endocrine/Metabolic History: Reports: Diabetes, Type II


Hematologic History: Reports: None


Immunologic History: Reports: None


Oncologic (Cancer) History: Reports: None


Dermatologic History: Reports: None





- Infectious Disease History


Infectious Disease History: Reports: C-Difficile





- Past Surgical History


Head Surgeries/Procedures: Reports: None





Social & Family History





- Family History


Family Medical History: Noncontributory





- Caffeine Use


Caffeine Use: Reports: Coffee





ED ROS GENERAL





- Review of Systems


Review Of Systems: See Below


Constitutional: Reports: No Symptoms, Weakness


HEENT: Reports: No Symptoms


Respiratory: Reports: No Symptoms


Cardiovascular: Reports: Lightheadedness, Syncope


Endocrine: Reports: No Symptoms


GI/Abdominal: Reports: No Symptoms


: Reports: No Symptoms


Musculoskeletal: Reports: No Symptoms


Skin: Reports: Bruising


Neurological: Reports: No Symptoms, Confusion, Syncope


Psychiatric: Reports: No Symptoms


Hematologic/Lymphatic: Reports: No Symptoms


Immunologic: Reports: No Symptoms





ED EXAM, GENERAL





- Physical Exam


Exam: See Below


Exam Limited By: Altered Mental Status


General Appearance: Alert, WD/WN, Mild Distress


Ears: Normal External Exam, Normal Canal, Hearing Grossly Normal, Normal TMs


Ear Exam: Bilateral Ear: Auricle Normal, Canal Normal, TM normal


Nose: Normal Inspection


Throat/Mouth: Normal Inspection, Normal Lips, Normal Teeth, Normal Gums, Normal 

Oropharynx, Normal Voice, No Airway Compromise


Head: Atraumatic, Normocephalic


Neck: Normal Inspection, Supple, Non-Tender, Full Range of Motion


Respiratory/Chest: No Respiratory Distress, Lungs Clear, Prolonged Expiration


Cardiovascular: Normal Peripheral Pulses, Regular Rate, Rhythm, No Edema, No 

Gallop, No JVD, No Murmur, No Rub


GI/Abdominal: Normal Bowel Sounds, Soft, Non-Tender, No Organomegaly, No 

Distention, No Abnormal Bruit, No Mass


 (Male) Exam: Deferred


Rectal (Males) Exam: Deferred, Other (Stool incontinence)


Back Exam: Decreased Range of Motion, Other (L2 fracture&)


Extremities: Normal Inspection, Normal Range of Motion, Non-Tender, Normal 

Capillary Refill, No Pedal Edema


Neurological: Alert (Answers simple questions), Oriented (Answers simple 

questions), CN II-XII Intact


Psychiatric: Depressed Mood


Skin Exam: Warm, Dry, Intact, Normal Color, No Rash


Lymphatic: No Adenopathy





Course





- Vital Signs


Last Recorded V/S: 


 Last Vital Signs











Temp  97.7 F   10/14/19 00:06


 


Pulse  89   10/14/19 00:06


 


Resp  20   10/14/19 00:06


 


BP  100/58 L  10/14/19 00:06


 


Pulse Ox  98   10/14/19 00:06














- Orders/Labs/Meds


Orders: 


 Active Orders 24 hr











 Category Date Time Status


 


 Cardiac Monitoring [RC] .AS DIRECTED Care  10/14/19 00:18 Ordered


 


 EKG Documentation Completion [RC] ASDIRECTED Care  10/14/19 00:19 Ordered


 


 Head wo Cont [CT] Stat Exams  10/14/19 00:18 Ordered


 


 Lumbar Spine wo Cont [CT] Stat Exams  10/14/19 00:31 Ordered


 


 Sodium Chloride 0.9% [Normal Saline] 1,000 ml Med  10/14/19 01:15 Ordered





 IV ASDIRECTED   


 


 Sodium Chloride 0.9% [Saline Flush] Med  10/14/19 01:16 Ordered





 10 ml FLUSH ASDIRECTED PRN   


 


 Saline Lock Insert [OM.PC] Stat Oth  10/14/19 01:16 Ordered








 Medication Orders





Sodium Chloride (Normal Saline)  1,000 mls @ 250 mls/hr IV ASDIRECTED JONATHAN


   Last Admin: 10/14/19 01:20  Dose: 250 mls/hr


Sodium Chloride (Saline Flush)  10 ml FLUSH ASDIRECTED PRN


   PRN Reason: Keep Vein Open


   Last Admin: 10/14/19 01:21  Dose: 10 ml








Labs: 


 Laboratory Tests











  10/14/19 10/14/19 10/14/19 Range/Units





  00:30 00:30 00:30 


 


WBC  8.1    (4.0-10.2)  K/uL


 


RBC  3.59 L    (4.33-5.41)  M/uL


 


Hgb  11.3 L    (13.1-16.8)  g/dL


 


Hct  35.1 L    (39.0-49.0)  %


 


MCV  97.8    (84.0-98.0)  fL


 


MCH  31.5    (28.2-33.3)  pg


 


MCHC  32.2    (31.7-36.0)  g/dL


 


RDW  13.8    (11.2-14.1)  %


 


Plt Count  349    (150-350)  K/uL


 


Neut % (Auto)  60.1    (45.0-80.0)  %


 


Lymph % (Auto)  27.6    (10.0-50.0)  %


 


Mono % (Auto)  10.7    (2.0-14.0)  %


 


Eos % (Auto)  1.4    (0.0-5.0)  %


 


Baso % (Auto)  0.2    (0.0-2.0)  %


 


Neut # (Auto)  4.88    (1.40-7.00)  K/uL


 


Lymph # (Auto)  2.24    (0.50-3.50)  K/uL


 


Mono # (Auto)  0.87    (0.00-1.00)  K/uL


 


Eos # (Auto)  0.11    (0.00-0.50)  K/uL


 


Baso # (Auto)  0.02    (0.00-0.20)  K/uL


 


D-Dimer, Quantitative    778 H  (0-400)  ng/mL


 


Sodium   139   (136-145)  mmol/L


 


Potassium   4.7   (3.5-5.1)  mmol/L


 


Chloride   103   ()  mmol/L


 


Carbon Dioxide   24.2   (21.0-32.0)  mmol/L


 


BUN   34 H   (7-18)  mg/dL


 


Creatinine   3.82 H*   (0.51-1.17)  mg/dL


 


Est Cr Clr Drug Dosing   19.15   mL/min


 


Estimated GFR (MDRD)   16   mL/min


 


Glucose   173 H   ()  mg/dL


 


Calcium   9.4   (8.5-10.1)  mg/dL


 


Total Bilirubin   0.5   (0.2-1.0)  mg/dL


 


AST   11 L   (15-37)  U/L


 


ALT   22   (12-78)  U/L


 


Alkaline Phosphatase   130 H   ()  IU/L


 


Total Protein   7.3   (6.4-8.2)  g/dL


 


Albumin   3.4   (3.4-5.0)  g/dL


 


Ethyl Alcohol     (0.000-0.080)  g/dL














  10/14/19 Range/Units





  00:30 


 


WBC   (4.0-10.2)  K/uL


 


RBC   (4.33-5.41)  M/uL


 


Hgb   (13.1-16.8)  g/dL


 


Hct   (39.0-49.0)  %


 


MCV   (84.0-98.0)  fL


 


MCH   (28.2-33.3)  pg


 


MCHC   (31.7-36.0)  g/dL


 


RDW   (11.2-14.1)  %


 


Plt Count   (150-350)  K/uL


 


Neut % (Auto)   (45.0-80.0)  %


 


Lymph % (Auto)   (10.0-50.0)  %


 


Mono % (Auto)   (2.0-14.0)  %


 


Eos % (Auto)   (0.0-5.0)  %


 


Baso % (Auto)   (0.0-2.0)  %


 


Neut # (Auto)   (1.40-7.00)  K/uL


 


Lymph # (Auto)   (0.50-3.50)  K/uL


 


Mono # (Auto)   (0.00-1.00)  K/uL


 


Eos # (Auto)   (0.00-0.50)  K/uL


 


Baso # (Auto)   (0.00-0.20)  K/uL


 


D-Dimer, Quantitative   (0-400)  ng/mL


 


Sodium   (136-145)  mmol/L


 


Potassium   (3.5-5.1)  mmol/L


 


Chloride   ()  mmol/L


 


Carbon Dioxide   (21.0-32.0)  mmol/L


 


BUN   (7-18)  mg/dL


 


Creatinine   (0.51-1.17)  mg/dL


 


Est Cr Clr Drug Dosing   mL/min


 


Estimated GFR (MDRD)   mL/min


 


Glucose   ()  mg/dL


 


Calcium   (8.5-10.1)  mg/dL


 


Total Bilirubin   (0.2-1.0)  mg/dL


 


AST   (15-37)  U/L


 


ALT   (12-78)  U/L


 


Alkaline Phosphatase   ()  IU/L


 


Total Protein   (6.4-8.2)  g/dL


 


Albumin   (3.4-5.0)  g/dL


 


Ethyl Alcohol  0.000  (0.000-0.080)  g/dL











Meds: 


Medications











Generic Name Dose Route Start Last Admin





  Trade Name Freq  PRN Reason Stop Dose Admin


 


Sodium Chloride  1,000 mls @ 250 mls/hr  10/14/19 01:15  10/14/19 01:20





  Normal Saline  IV   250 mls/hr





  ASDIRECTED JONATHAN   Administration





     





     





     





     


 


Sodium Chloride  10 ml  10/14/19 01:16  10/14/19 01:21





  Saline Flush  FLUSH   10 ml





  ASDIRECTED PRN   Administration





  Keep Vein Open   





     





     





     














Departure





- Departure


Time of Disposition: 01:37


Disposition: DC/Tfer to [facility]


Clinical Impression: 


Acute renal failure


Qualifiers:


 Acute renal failure type: unspecified Qualified Code(s): N17.9 - Acute kidney 

failure, unspecified





Diabetes


Qualifiers:


 Diabetes mellitus type: type 2 Diabetes mellitus long term insulin use: 

without long term use Diabetes mellitus complication status: with kidney 

complications Diabetes mellitus complication detail: with chronic kidney 

disease Chronic kidney disease stage: unspecified stage Qualified Code(s): 

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





Hypertension


Qualifiers:


 Hypertension type: unspecified Qualified Code(s): I10 - Essential (primary) 

hypertension





Compression fx, lumbar spine


Qualifiers:


 Encounter type: initial encounter Lumbar vertebra fracture level: unspecified 

lumbar vertebra Qualified Code(s): S32.000A - Wedge compression fracture of 

unspecified lumbar vertebra, initial encounter for closed fracture








- Discharge Information


*PRESCRIPTION DRUG MONITORING PROGRAM REVIEWED*: No


*COPY OF PRESCRIPTION DRUG MONITORING REPORT IN PATIENT GRAHAM: No


Forms:  ED Department Discharge





- My Orders


Last 24 Hours: 


My Active Orders





10/14/19 00:18


Cardiac Monitoring [RC] .AS DIRECTED 


Head wo Cont [CT] Stat 





10/14/19 00:19


EKG Documentation Completion [RC] ASDIRECTED 





10/14/19 00:31


Lumbar Spine wo Cont [CT] Stat 





10/14/19 01:15


Sodium Chloride 0.9% [Normal Saline] 1,000 ml IV ASDIRECTED 





10/14/19 01:16


Sodium Chloride 0.9% [Saline Flush]   10 ml FLUSH ASDIRECTED PRN 


Saline Lock Insert [OM.PC] Stat 














- Assessment/Plan


Last 24 Hours: 


My Active Orders





10/14/19 00:18


Cardiac Monitoring [RC] .AS DIRECTED 


Head wo Cont [CT] Stat 





10/14/19 00:19


EKG Documentation Completion [RC] ASDIRECTED 





10/14/19 00:31


Lumbar Spine wo Cont [CT] Stat 





10/14/19 01:15


Sodium Chloride 0.9% [Normal Saline] 1,000 ml IV ASDIRECTED 





10/14/19 01:16


Sodium Chloride 0.9% [Saline Flush]   10 ml FLUSH ASDIRECTED PRN 


Saline Lock Insert [OM.PC] Stat

## 2021-12-01 ENCOUNTER — HOSPITAL ENCOUNTER (EMERGENCY)
Dept: HOSPITAL 43 - DL.ED | Age: 66
Discharge: HOME | End: 2021-12-01
Payer: MEDICAID

## 2021-12-01 DIAGNOSIS — E78.00: ICD-10-CM

## 2021-12-01 DIAGNOSIS — M79.604: Primary | ICD-10-CM

## 2021-12-01 DIAGNOSIS — M19.90: ICD-10-CM

## 2021-12-01 DIAGNOSIS — E11.9: ICD-10-CM

## 2021-12-01 DIAGNOSIS — I10: ICD-10-CM

## 2021-12-01 DIAGNOSIS — Z79.82: ICD-10-CM

## 2021-12-01 DIAGNOSIS — Z79.84: ICD-10-CM

## 2021-12-01 DIAGNOSIS — Z79.899: ICD-10-CM

## 2021-12-01 LAB
ANION GAP SERPL CALC-SCNC: 13.3 MEQ/L (ref 7–13)
CHLORIDE SERPL-SCNC: 106 MMOL/L (ref 98–107)
SODIUM SERPL-SCNC: 147 MMOL/L (ref 136–145)

## 2021-12-01 PROCEDURE — 36415 COLL VENOUS BLD VENIPUNCTURE: CPT

## 2021-12-01 PROCEDURE — 73590 X-RAY EXAM OF LOWER LEG: CPT

## 2021-12-01 PROCEDURE — 85379 FIBRIN DEGRADATION QUANT: CPT

## 2021-12-01 PROCEDURE — 83735 ASSAY OF MAGNESIUM: CPT

## 2021-12-01 PROCEDURE — 85610 PROTHROMBIN TIME: CPT

## 2021-12-01 PROCEDURE — 73610 X-RAY EXAM OF ANKLE: CPT

## 2021-12-01 PROCEDURE — 83880 ASSAY OF NATRIURETIC PEPTIDE: CPT

## 2021-12-01 PROCEDURE — 99284 EMERGENCY DEPT VISIT MOD MDM: CPT

## 2021-12-01 PROCEDURE — 82140 ASSAY OF AMMONIA: CPT

## 2021-12-01 PROCEDURE — 80053 COMPREHEN METABOLIC PANEL: CPT

## 2021-12-01 PROCEDURE — 85025 COMPLETE CBC W/AUTO DIFF WBC: CPT

## 2021-12-01 PROCEDURE — 80307 DRUG TEST PRSMV CHEM ANLYZR: CPT

## 2021-12-01 NOTE — CR
PROCEDURE INFORMATION: 

Exam: XR Right Tibia and Fibula 

Exam date and time: 12/1/2021 7:29 PM 

Age: 66 years old 

Clinical indication: Other: Fall 3 days ago, swelling pain 



TECHNIQUE: 

Imaging protocol: XR Right tibia and fibula. 

Views: 2 views. 



COMPARISON: 

CR Tibia Fibula Rt 12/6/2020 1:50 AM 



FINDINGS: 

Bones/joints: Moderate osteopenia is present. An old fracture of the tibial 

plateau is stabilized by plates and screws. This appears to be well healed as 

is the previous fracture of the proximal fibula. No definite acute fracture is 

appreciated though there is a small suprapatellar effusion present. Loss of 

articular cartilage in the medial compartment is noted.  Degenerative changes 

of the tibio talar joint is also noted.

Soft tissues:  Soft tissue swelling is seen both medially and laterally about 

the ankle.

Vasculature: Moderate vascular calcifications are present. 



IMPRESSION: 

No definite acute fracture is identified.  2. Small suprapatellar effusion.

## 2021-12-01 NOTE — CR
PROCEDURE INFORMATION: 

Exam: XR Right Ankle 

Exam date and time: 12/1/2021 7:28 PM 

Age: 66 years old 

Clinical indication: Other: Fall 



TECHNIQUE: 

Imaging protocol: XR Right ankle. 

Views: 3 or more views. 



COMPARISON: 

CR Tibia Fibula Rt 12/6/2020 1:50 AM 



FINDINGS: 

Bones/joints: Osteopenia is again noted. No definite acute fracture is evident. 

Soft tissues: Soft tissue swelling is noted both medially and laterally. 

Vasculature: Moderate vascular calcifications are again present. 



IMPRESSION: 

No definite acute fracture is appreciated.

## 2021-12-01 NOTE — EDM.PDOC
ED HPI GENERAL MEDICAL PROBLEM





- General


Chief Complaint: Lower Extremity Injury/Pain


Stated Complaint: AMBULANCE


Time Seen by Provider: 21 19:15


Source of Information: Reports: Patient, Old Records


History Limitations: Reports: Altered Mental Status





- History of Present Illness


INITIAL COMMENTS - FREE TEXT/NARRATIVE: 





ED via [facility] with report of right lower leg pain, Reported to have fallen 3-4 

days prior.  Patient seen earlier at [facility], , xrays reported 

negative by EMS, No report available. Patient hx of dementia,  and limited 

ability to give hx. [facility] of [facility]. Reported to 

have been in [place] EMS unsure where for sure and either patient left or family 

picked him up and is local for  but patient unable to give any family 

info and "doesn't know" who's  he was here for.  Remote injury to right 

leg.  No report of fever or chills. Unsure if leg more swollen than usual. 


  ** Right Leg


Pain Score (Numeric/FACES): 8





- Related Data


                                    Allergies











Allergy/AdvReac Type Severity Reaction Status Date / Time


 


No Known Allergies Allergy   Verified 21 18:50











Home Meds: 


                                    Home Meds





DULoxetine [Cymbalta] 60 mg PO DAILY 18 [History]


Glimepiride [Amaryl] 2 mg PO BIDMEALS 18 [History]


Losartan [Cozaar] 100 mg PO DAILY 18 [History]


atorvaSTATin [Lipitor] 10 mg PO DAILY 18 [History]


metFORMIN HCl [Glucophage] 1,000 mg PO BIDMEALS 18 [History]


Aspirin [Halfprin] 81 mg PO DAILY 19 [History]


Gabapentin [Neurontin] 300 mg PO BID 19 [History]


Magnesium Oxide [Magnesium] 400 mg PO BID 19 [History]


Sodium Chloride 1 gm PO TID 19 [History]


Calcium Carbonate/Vitamin D3 [Oyster Shell 500-Vit D3 200 Tb] 1 tab PO DAILY 

10/14/19 [History]


Cholecalciferol (Vitamin D3) [Vitamin D3] 1,000 unit PO DAILY 10/14/19 [History]











Past Medical History


HEENT History: Reports: None


Cardiovascular History: Reports: High Cholesterol, Hypertension


Respiratory History: Reports: None


Gastrointestinal History: Reports: Other (See Below)


Other Gastrointestinal History: c-diff


Genitourinary History: Reports: None


Musculoskeletal History: Reports: Osteoarthritis


Other Musculoskeletal History: COMPRESSION FRACTURE t12, L1, L5, nerve pain


Neurological History: Reports: Other (See Below)


Other Neuro History: BILATERAL HAND PAIN, RESTLESS LEGS


Psychiatric History: Reports: Addiction, Depression


Endocrine/Metabolic History: Reports: Diabetes, Type II


Hematologic History: Reports: None


Immunologic History: Reports: None


Oncologic (Cancer) History: Reports: None


Dermatologic History: Reports: None





- Infectious Disease History


Infectious Disease History: Reports: C-Difficile





- Past Surgical History


Head Surgeries/Procedures: Reports: None





Social & Family History





- Family History


Family Medical History: No Pertinent Family History





- Tobacco Use


Tobacco Use Status *Q: Never Tobacco User


Second Hand Smoke Exposure: No





- Caffeine Use


Caffeine Use: Reports: Coffee





- Recreational Drug Use


Recreational Drug Use: No





Review of Systems





- Review of Systems


Review Of Systems: Comprehensive ROS is negative, except as noted in HPI.





ED EXAM, GENERAL





- Physical Exam


Exam: See Below


Exam Limited By: No Limitations


General Appearance: Alert, No Apparent Distress


Eye Exam: Bilateral Eye: EOMI


Ears: Normal External Exam, Hearing Loss


Nose: Normal Inspection


Throat/Mouth: Normal Inspection


Head: Atraumatic, Normocephalic


Neck: Normal Inspection


Respiratory/Chest: No Respiratory Distress, Lungs Clear, Normal Breath Sounds


Cardiovascular: Normal Peripheral Pulses, Regular Rate, Rhythm


GI/Abdominal: Normal Bowel Sounds, Soft


Extremities: Pedal Edema (right 2+).  No: No Pedal Edema (right lower mid to 

foot), Increased Warmth, Pallor, Redness


Neurological: Alert, Oriented (person), Slow to Respond





Course





- Vital Signs


Last Recorded V/S: 


                                Last Vital Signs











Temp  98.1 F   21 21:41


 


Pulse  79   21 21:41


 


Resp  20   21 21:41


 


BP  137/95 H  21 21:41


 


Pulse Ox  98   21 21:41














- Orders/Labs/Meds


Labs: 


                                Laboratory Tests











  21 Range/Units





  19:20 19:20 19:20 


 


WBC  8.9    (5.0-10.0)  10^3/uL


 


RBC  4.11 L    (4.6-6.2)  10^6/uL


 


Hgb  12.0 L    (14.0-18.0)  g/dL


 


Hct  36.5 L    (40.0-54.0)  %


 


MCV  88.8    ()  fL


 


MCH  29.2    (27.0-34.0)  pg


 


MCHC  32.9 L    (33.0-35.0)  g/dL


 


Plt Count  304  D    (150-450)  10^3/uL


 


Neut % (Auto)  66.3    (42.2-75.2)  %


 


Lymph % (Auto)  26.4    (20.5-50.1)  %


 


Mono % (Auto)  6.1    (2-8)  %


 


Eos % (Auto)  0.9 L    (1.0-3.0)  %


 


Baso % (Auto)  0.3    (0.0-1.0)  %


 


PT     (9.0-12.0)  SEC


 


INR     (0.9-1.2)  


 


D-Dimer, Quantitative   342   (0-400)  ng/mL


 


Sodium    147 H D  (136-145)  mmol/L


 


Potassium    3.3 L  (3.5-5.1)  mmol/L


 


Chloride    106  ()  mmol/L


 


Carbon Dioxide    31  (21-32)  mmol/L


 


Anion Gap    13.3 H  (7-13)  mEq/L


 


BUN    15  (7-18)  mg/dL


 


Creatinine    0.89  (0.70-1.30)  mg/dL


 


Est Cr Clr Drug Dosing    80.06  mL/min


 


Estimated GFR (MDRD)    > 60  


 


BUN/Creatinine Ratio    16.9  (No establ ref range)  


 


Glucose    153 H  (70-99)  mg/dL


 


Calcium    8.8  (8.5-10.1)  mg/dL


 


Magnesium    1.7 L  (1.8-2.4)  mg/dL


 


Total Bilirubin    0.3  (0.2-1.0)  mg/dL


 


AST    18  (15-37)  U/L


 


ALT    21  (16-63)  U/L


 


Alkaline Phosphatase    100  ()  U/L


 


Ammonia     (11-32)  umol/L


 


B-Natriuretic Peptide    63  (0-100)  pg/ml


 


Total Protein    7.3  (6.4-8.2)  g/dL


 


Albumin    3.5  (3.4-5.0)  g/dL


 


Globulin    3.8  


 


Albumin/Globulin Ratio    0.9  


 


Ethyl Alcohol    < 3  (0)  mg/dL














  21 Range/Units





  19:20 19:20 


 


WBC    (5.0-10.0)  10^3/uL


 


RBC    (4.6-6.2)  10^6/uL


 


Hgb    (14.0-18.0)  g/dL


 


Hct    (40.0-54.0)  %


 


MCV    ()  fL


 


MCH    (27.0-34.0)  pg


 


MCHC    (33.0-35.0)  g/dL


 


Plt Count    (150-450)  10^3/uL


 


Neut % (Auto)    (42.2-75.2)  %


 


Lymph % (Auto)    (20.5-50.1)  %


 


Mono % (Auto)    (2-8)  %


 


Eos % (Auto)    (1.0-3.0)  %


 


Baso % (Auto)    (0.0-1.0)  %


 


PT  9.9   (9.0-12.0)  SEC


 


INR  1.0   (0.9-1.2)  


 


D-Dimer, Quantitative    (0-400)  ng/mL


 


Sodium    (136-145)  mmol/L


 


Potassium    (3.5-5.1)  mmol/L


 


Chloride    ()  mmol/L


 


Carbon Dioxide    (21-32)  mmol/L


 


Anion Gap    (7-13)  mEq/L


 


BUN    (7-18)  mg/dL


 


Creatinine    (0.70-1.30)  mg/dL


 


Est Cr Clr Drug Dosing    mL/min


 


Estimated GFR (MDRD)    


 


BUN/Creatinine Ratio    (No establ ref range)  


 


Glucose    (70-99)  mg/dL


 


Calcium    (8.5-10.1)  mg/dL


 


Magnesium    (1.8-2.4)  mg/dL


 


Total Bilirubin    (0.2-1.0)  mg/dL


 


AST    (15-37)  U/L


 


ALT    (16-63)  U/L


 


Alkaline Phosphatase    ()  U/L


 


Ammonia   15  (11-32)  umol/L


 


B-Natriuretic Peptide    (0-100)  pg/ml


 


Total Protein    (6.4-8.2)  g/dL


 


Albumin    (3.4-5.0)  g/dL


 


Globulin    


 


Albumin/Globulin Ratio    


 


Ethyl Alcohol    (0)  mg/dL











Meds: 


Medications














Discontinued Medications














Generic Name Dose Route Start Last Admin





  Trade Name Freq  PRN Reason Stop Dose Admin


 


Acetaminophen  650 mg  21 21:00  21 21:46





  Acetaminophen 325 Mg Tab  PO  21 21:01  650 mg





  NOW ONE   Administration


 


Potassium Chloride  20 meq  21 21:00  21 21:44





  Potassium Chloride 10 Meq Tab.Er  PO  21 21:01  20 meq





  ONETIME ONE   Administration














- Re-Assessments/Exams


Free Text/Narrative Re-Assessment/Exam: 





[facility]  is aware of patient, notes [facility] have been working 

on palcement for him. [facility]. [facility] 

 will come and  patient. 








Departure





- Departure


Time of Disposition: 21:50


Disposition: Home, Self-Care 01


Condition: Good


Clinical Impression: 


 Right leg pain








- Discharge Information


*PRESCRIPTION DRUG MONITORING PROGRAM REVIEWED*: No


*COPY OF PRESCRIPTION DRUG MONITORING REPORT IN PATIENT JESÚS: No


Instructions:  Muscle Strain, Easy-to-Read, Leg Cramps


Referrals: 


 Joon Hallman [Primary Care Provider] - 


Forms:  ED Department Discharge


Additional Instructions: 


elevate extremity  


weight bearing as tolerated


use walker for support


tylenol 500mg every 4 hours as needed for discomfort


continue home medications


limit salt sodium consumption





Sepsis Event Note (ED)





- Evaluation


Sepsis Screening Result: No Definite Risk